# Patient Record
Sex: MALE | Race: WHITE | Employment: FULL TIME | URBAN - METROPOLITAN AREA
[De-identification: names, ages, dates, MRNs, and addresses within clinical notes are randomized per-mention and may not be internally consistent; named-entity substitution may affect disease eponyms.]

---

## 2020-09-08 ENCOUNTER — OFFICE VISIT (OUTPATIENT)
Dept: INTERNAL MEDICINE CLINIC | Age: 55
End: 2020-09-08
Payer: COMMERCIAL

## 2020-09-08 VITALS
HEIGHT: 67 IN | HEART RATE: 90 BPM | WEIGHT: 238 LBS | TEMPERATURE: 97 F | DIASTOLIC BLOOD PRESSURE: 88 MMHG | BODY MASS INDEX: 37.35 KG/M2 | SYSTOLIC BLOOD PRESSURE: 134 MMHG

## 2020-09-08 DIAGNOSIS — Z13.1 DIABETES MELLITUS SCREENING: ICD-10-CM

## 2020-09-08 DIAGNOSIS — Z11.59 NEED FOR HEPATITIS C SCREENING TEST: ICD-10-CM

## 2020-09-08 DIAGNOSIS — Z01.818 PRE-OP EXAM: ICD-10-CM

## 2020-09-08 DIAGNOSIS — Z11.4 SCREENING FOR HIV (HUMAN IMMUNODEFICIENCY VIRUS): ICD-10-CM

## 2020-09-08 DIAGNOSIS — Z13.220 SCREENING CHOLESTEROL LEVEL: ICD-10-CM

## 2020-09-08 DIAGNOSIS — Z00.00 ANNUAL PHYSICAL EXAM: ICD-10-CM

## 2020-09-08 LAB
A/G RATIO: 1.9 (ref 1.1–2.2)
ALBUMIN SERPL-MCNC: 4.5 G/DL (ref 3.4–5)
ALP BLD-CCNC: 72 U/L (ref 40–129)
ALT SERPL-CCNC: 22 U/L (ref 10–40)
ANION GAP SERPL CALCULATED.3IONS-SCNC: 10 MMOL/L (ref 3–16)
AST SERPL-CCNC: 22 U/L (ref 15–37)
BASOPHILS ABSOLUTE: 0 K/UL (ref 0–0.2)
BASOPHILS RELATIVE PERCENT: 0.6 %
BILIRUB SERPL-MCNC: 0.4 MG/DL (ref 0–1)
BUN BLDV-MCNC: 14 MG/DL (ref 7–20)
CALCIUM SERPL-MCNC: 9.9 MG/DL (ref 8.3–10.6)
CHLORIDE BLD-SCNC: 103 MMOL/L (ref 99–110)
CHOLESTEROL, TOTAL: 239 MG/DL (ref 0–199)
CO2: 25 MMOL/L (ref 21–32)
CREAT SERPL-MCNC: 0.9 MG/DL (ref 0.9–1.3)
EOSINOPHILS ABSOLUTE: 0.1 K/UL (ref 0–0.6)
EOSINOPHILS RELATIVE PERCENT: 1.4 %
GFR AFRICAN AMERICAN: >60
GFR NON-AFRICAN AMERICAN: >60
GLOBULIN: 2.4 G/DL
GLUCOSE BLD-MCNC: 92 MG/DL (ref 70–99)
HCT VFR BLD CALC: 46.4 % (ref 40.5–52.5)
HDLC SERPL-MCNC: 36 MG/DL (ref 40–60)
HEMOGLOBIN: 15.5 G/DL (ref 13.5–17.5)
HEPATITIS C ANTIBODY INTERPRETATION: NORMAL
LDL CHOLESTEROL CALCULATED: 147 MG/DL
LYMPHOCYTES ABSOLUTE: 3 K/UL (ref 1–5.1)
LYMPHOCYTES RELATIVE PERCENT: 42.2 %
MCH RBC QN AUTO: 31.5 PG (ref 26–34)
MCHC RBC AUTO-ENTMCNC: 33.4 G/DL (ref 31–36)
MCV RBC AUTO: 94.2 FL (ref 80–100)
MONOCYTES ABSOLUTE: 0.6 K/UL (ref 0–1.3)
MONOCYTES RELATIVE PERCENT: 8.5 %
NEUTROPHILS ABSOLUTE: 3.4 K/UL (ref 1.7–7.7)
NEUTROPHILS RELATIVE PERCENT: 47.3 %
PDW BLD-RTO: 14.1 % (ref 12.4–15.4)
PLATELET # BLD: 247 K/UL (ref 135–450)
PMV BLD AUTO: 8.6 FL (ref 5–10.5)
POTASSIUM SERPL-SCNC: 5 MMOL/L (ref 3.5–5.1)
RBC # BLD: 4.92 M/UL (ref 4.2–5.9)
SODIUM BLD-SCNC: 138 MMOL/L (ref 136–145)
TOTAL PROTEIN: 6.9 G/DL (ref 6.4–8.2)
TRIGL SERPL-MCNC: 278 MG/DL (ref 0–150)
VLDLC SERPL CALC-MCNC: 56 MG/DL
WBC # BLD: 7.1 K/UL (ref 4–11)

## 2020-09-08 PROCEDURE — 93000 ELECTROCARDIOGRAM COMPLETE: CPT | Performed by: NURSE PRACTITIONER

## 2020-09-08 PROCEDURE — 99386 PREV VISIT NEW AGE 40-64: CPT | Performed by: NURSE PRACTITIONER

## 2020-09-08 SDOH — HEALTH STABILITY: MENTAL HEALTH: HOW OFTEN DO YOU HAVE A DRINK CONTAINING ALCOHOL?: 2-3 TIMES A WEEK

## 2020-09-08 ASSESSMENT — ENCOUNTER SYMPTOMS
GASTROINTESTINAL NEGATIVE: 1
RESPIRATORY NEGATIVE: 1

## 2020-09-08 ASSESSMENT — PATIENT HEALTH QUESTIONNAIRE - PHQ9
SUM OF ALL RESPONSES TO PHQ9 QUESTIONS 1 & 2: 0
SUM OF ALL RESPONSES TO PHQ QUESTIONS 1-9: 0
2. FEELING DOWN, DEPRESSED OR HOPELESS: 0
1. LITTLE INTEREST OR PLEASURE IN DOING THINGS: 0
SUM OF ALL RESPONSES TO PHQ QUESTIONS 1-9: 0

## 2020-09-08 NOTE — PROGRESS NOTES
Palpations: Abdomen is soft. Tenderness: There is no abdominal tenderness. There is no guarding or rebound. Musculoskeletal: Normal range of motion. Skin:     General: Skin is warm and dry. Neurological:      Mental Status: He is alert and oriented to person, place, and time. Psychiatric:         Behavior: Behavior normal.         Thought Content: Thought content normal.        /88   Pulse 90   Temp 97 °F (36.1 °C) (Temporal)   Ht 5' 7\" (1.702 m)   Wt 238 lb (108 kg)   BMI 37.28 kg/m²        PHQ Scores 9/8/2020   PHQ2 Score 0   PHQ9 Score 0     Interpretation of Total Score DepressionSeverity: 1-4 = Minimal depression, 5-9 = Mild depression, 10-14 = Moderate depression, 15-19 = Moderately severe depression, 20-27 = Severe depression         ASSESSMENT/PLAN:  Luana Jones was seen today for established new doctor and pre-op exam.  Diagnoses and all orders for this visit:    Encounter to establish care  -H&P reviewed and scanned into EMR  -He is here to establish as well as needs a preop for shoulder surgery. Annual physical exam  -     COMPREHENSIVE METABOLIC PANEL; Future  -     CBC WITH AUTO DIFFERENTIAL; Future  -     LIPID PANEL; Future  -     Hepatitis C Antibody; Future  -     HIV Screen; Future  -     Hemoglobin A1C; Future  -     COLONOSCOPY (Screening)    Pre-op exam  -     EKG 12 lead  -     COMPREHENSIVE METABOLIC PANEL; Future  -     CBC WITH AUTO DIFFERENTIAL; Future    Diabetes mellitus screening  -     Hemoglobin A1C; Future    Screening cholesterol level  -     LIPID PANEL; Future    Screening for HIV (human immunodeficiency virus)  -     HIV Screen; Future    Need for hepatitis C screening test  -     Hepatitis C Antibody; Future    Colon cancer screening  -     COLONOSCOPY (Screening)      47 y.o. patient with planned surgery as above. Known risk factors for perioperative complications: None    1.  Preoperative workup as follows: ECG, hemoglobin, hematocrit, electrolytes, creatinine, glucose, liver function studies  2. Change in medication regimen before surgery: Discontinue NSAIDs 7 days before surgery  3. Prophylaxis for cardiac events with perioperative beta-blockers: Not indicated  4. Deep vein thrombosis prophylaxis: regimen to be chosen by surgical team  5. No contraindications to planned surgery.       Kerri Gruber, APRN - CNP

## 2020-09-09 LAB
ESTIMATED AVERAGE GLUCOSE: 125.5 MG/DL
HBA1C MFR BLD: 6 %
HIV AG/AB: NORMAL
HIV ANTIGEN: NORMAL
HIV-1 ANTIBODY: NORMAL
HIV-2 AB: NORMAL

## 2020-09-09 RX ORDER — ATORVASTATIN CALCIUM 40 MG/1
40 TABLET, FILM COATED ORAL NIGHTLY
Qty: 90 TABLET | Refills: 1 | Status: SHIPPED | OUTPATIENT
Start: 2020-09-09 | End: 2021-01-19 | Stop reason: SDUPTHER

## 2020-09-11 ENCOUNTER — TELEPHONE (OUTPATIENT)
Dept: INTERNAL MEDICINE CLINIC | Age: 55
End: 2020-09-11

## 2020-09-11 NOTE — TELEPHONE ENCOUNTER
----- Message from Leonor Wiggins sent at 9/11/2020  8:09 AM EDT -----  Subject: Message to Provider    QUESTIONS  Information for Provider? Mc adamson/ Pipo Perez called needs the signed   EKG and his labs. Please fax [phone]    [phone] option 5. Surgery is scheduled 09/16/2020  ---------------------------------------------------------------------------  --------------  CALL BACK INFO  What is the best way for the office to contact you? OK to leave message on   voicemail  Preferred Call Back Phone Number? [phone]  ---------------------------------------------------------------------------  --------------  SCRIPT ANSWERS  Relationship to Patient? Other  Representative Name? Devonte Perez  Is the Representative on the appropriate HIPAA document in Epic?  Yes

## 2020-10-27 ENCOUNTER — HOSPITAL ENCOUNTER (INPATIENT)
Age: 55
LOS: 2 days | Discharge: HOME | DRG: 854 | End: 2020-10-29
Attending: EMERGENCY MEDICINE | Admitting: INTERNAL MEDICINE
Payer: COMMERCIAL

## 2020-10-27 ENCOUNTER — APPOINTMENT (OUTPATIENT)
Dept: CT IMAGING | Age: 55
DRG: 854 | End: 2020-10-27
Payer: COMMERCIAL

## 2020-10-27 PROBLEM — N20.0 KIDNEY STONE: Status: ACTIVE | Noted: 2020-10-27

## 2020-10-27 LAB
A/G RATIO: 1.1 (ref 1.1–2.2)
ALBUMIN SERPL-MCNC: 4 G/DL (ref 3.4–5)
ALP BLD-CCNC: 88 U/L (ref 40–129)
ALT SERPL-CCNC: 17 U/L (ref 10–40)
ANION GAP SERPL CALCULATED.3IONS-SCNC: 14 MMOL/L (ref 3–16)
AST SERPL-CCNC: 15 U/L (ref 15–37)
BACTERIA: ABNORMAL /HPF
BASOPHILS ABSOLUTE: 0.1 K/UL (ref 0–0.2)
BASOPHILS RELATIVE PERCENT: 0.3 %
BILIRUB SERPL-MCNC: 0.3 MG/DL (ref 0–1)
BILIRUBIN URINE: NEGATIVE
BLOOD, URINE: ABNORMAL
BUN BLDV-MCNC: 16 MG/DL (ref 7–20)
CALCIUM SERPL-MCNC: 9.6 MG/DL (ref 8.3–10.6)
CHLORIDE BLD-SCNC: 105 MMOL/L (ref 99–110)
CLARITY: ABNORMAL
CO2: 19 MMOL/L (ref 21–32)
COLOR: YELLOW
COMMENT UA: ABNORMAL
CREAT SERPL-MCNC: 1.2 MG/DL (ref 0.9–1.3)
CRYSTALS, UA: ABNORMAL /HPF
EOSINOPHILS ABSOLUTE: 0 K/UL (ref 0–0.6)
EOSINOPHILS RELATIVE PERCENT: 0.2 %
EPITHELIAL CELLS, UA: 0 /HPF (ref 0–5)
GFR AFRICAN AMERICAN: >60
GFR NON-AFRICAN AMERICAN: >60
GLOBULIN: 3.6 G/DL
GLUCOSE BLD-MCNC: 150 MG/DL (ref 70–99)
GLUCOSE URINE: NEGATIVE MG/DL
HCT VFR BLD CALC: 44.5 % (ref 40.5–52.5)
HEMOGLOBIN: 15.2 G/DL (ref 13.5–17.5)
HYALINE CASTS: 4 /LPF (ref 0–8)
KETONES, URINE: NEGATIVE MG/DL
LEUKOCYTE ESTERASE, URINE: ABNORMAL
LIPASE: 22 U/L (ref 13–60)
LYMPHOCYTES ABSOLUTE: 2.6 K/UL (ref 1–5.1)
LYMPHOCYTES RELATIVE PERCENT: 13.7 %
MCH RBC QN AUTO: 31 PG (ref 26–34)
MCHC RBC AUTO-ENTMCNC: 34 G/DL (ref 31–36)
MCV RBC AUTO: 91.2 FL (ref 80–100)
MICROSCOPIC EXAMINATION: YES
MONOCYTES ABSOLUTE: 1.2 K/UL (ref 0–1.3)
MONOCYTES RELATIVE PERCENT: 6.6 %
NEUTROPHILS ABSOLUTE: 14.8 K/UL (ref 1.7–7.7)
NEUTROPHILS RELATIVE PERCENT: 79.2 %
NITRITE, URINE: NEGATIVE
PDW BLD-RTO: 13.6 % (ref 12.4–15.4)
PH UA: 5.5 (ref 5–8)
PLATELET # BLD: 553 K/UL (ref 135–450)
PMV BLD AUTO: 7.2 FL (ref 5–10.5)
POTASSIUM SERPL-SCNC: 4.1 MMOL/L (ref 3.5–5.1)
PROTEIN UA: ABNORMAL MG/DL
RBC # BLD: 4.89 M/UL (ref 4.2–5.9)
RBC UA: 2 /HPF (ref 0–4)
SODIUM BLD-SCNC: 138 MMOL/L (ref 136–145)
SPECIFIC GRAVITY UA: 1.02 (ref 1–1.03)
TOTAL PROTEIN: 7.6 G/DL (ref 6.4–8.2)
URINE REFLEX TO CULTURE: YES
URINE TYPE: ABNORMAL
UROBILINOGEN, URINE: 1 E.U./DL
WBC # BLD: 18.7 K/UL (ref 4–11)
WBC UA: 67 /HPF (ref 0–5)

## 2020-10-27 PROCEDURE — 87086 URINE CULTURE/COLONY COUNT: CPT

## 2020-10-27 PROCEDURE — 6360000002 HC RX W HCPCS: Performed by: INTERNAL MEDICINE

## 2020-10-27 PROCEDURE — 99285 EMERGENCY DEPT VISIT HI MDM: CPT

## 2020-10-27 PROCEDURE — 6370000000 HC RX 637 (ALT 250 FOR IP): Performed by: EMERGENCY MEDICINE

## 2020-10-27 PROCEDURE — 83690 ASSAY OF LIPASE: CPT

## 2020-10-27 PROCEDURE — 96375 TX/PRO/DX INJ NEW DRUG ADDON: CPT

## 2020-10-27 PROCEDURE — 87077 CULTURE AEROBIC IDENTIFY: CPT

## 2020-10-27 PROCEDURE — 81001 URINALYSIS AUTO W/SCOPE: CPT

## 2020-10-27 PROCEDURE — 85025 COMPLETE CBC W/AUTO DIFF WBC: CPT

## 2020-10-27 PROCEDURE — 2580000003 HC RX 258: Performed by: INTERNAL MEDICINE

## 2020-10-27 PROCEDURE — 87186 SC STD MICRODIL/AGAR DIL: CPT

## 2020-10-27 PROCEDURE — 6360000002 HC RX W HCPCS: Performed by: NURSE PRACTITIONER

## 2020-10-27 PROCEDURE — 87088 URINE BACTERIA CULTURE: CPT

## 2020-10-27 PROCEDURE — 80053 COMPREHEN METABOLIC PANEL: CPT

## 2020-10-27 PROCEDURE — 1200000000 HC SEMI PRIVATE

## 2020-10-27 PROCEDURE — 74176 CT ABD & PELVIS W/O CONTRAST: CPT

## 2020-10-27 PROCEDURE — 2580000003 HC RX 258: Performed by: NURSE PRACTITIONER

## 2020-10-27 PROCEDURE — 96374 THER/PROPH/DIAG INJ IV PUSH: CPT

## 2020-10-27 RX ORDER — TAMSULOSIN HYDROCHLORIDE 0.4 MG/1
0.4 CAPSULE ORAL ONCE
Status: COMPLETED | OUTPATIENT
Start: 2020-10-27 | End: 2020-10-27

## 2020-10-27 RX ORDER — ACETAMINOPHEN 650 MG/1
650 SUPPOSITORY RECTAL EVERY 6 HOURS PRN
Status: DISCONTINUED | OUTPATIENT
Start: 2020-10-27 | End: 2020-10-29 | Stop reason: HOSPADM

## 2020-10-27 RX ORDER — 0.9 % SODIUM CHLORIDE 0.9 %
1000 INTRAVENOUS SOLUTION INTRAVENOUS ONCE
Status: COMPLETED | OUTPATIENT
Start: 2020-10-27 | End: 2020-10-27

## 2020-10-27 RX ORDER — PROMETHAZINE HYDROCHLORIDE 25 MG/1
12.5 TABLET ORAL EVERY 6 HOURS PRN
Status: DISCONTINUED | OUTPATIENT
Start: 2020-10-27 | End: 2020-10-29 | Stop reason: HOSPADM

## 2020-10-27 RX ORDER — MORPHINE SULFATE 2 MG/ML
2 INJECTION, SOLUTION INTRAMUSCULAR; INTRAVENOUS EVERY 4 HOURS PRN
Status: DISCONTINUED | OUTPATIENT
Start: 2020-10-27 | End: 2020-10-29 | Stop reason: HOSPADM

## 2020-10-27 RX ORDER — SODIUM CHLORIDE 9 MG/ML
INJECTION, SOLUTION INTRAVENOUS CONTINUOUS
Status: ACTIVE | OUTPATIENT
Start: 2020-10-27 | End: 2020-10-28

## 2020-10-27 RX ORDER — KETOROLAC TROMETHAMINE 30 MG/ML
30 INJECTION, SOLUTION INTRAMUSCULAR; INTRAVENOUS ONCE
Status: COMPLETED | OUTPATIENT
Start: 2020-10-27 | End: 2020-10-27

## 2020-10-27 RX ORDER — SODIUM CHLORIDE 0.9 % (FLUSH) 0.9 %
10 SYRINGE (ML) INJECTION PRN
Status: DISCONTINUED | OUTPATIENT
Start: 2020-10-27 | End: 2020-10-29 | Stop reason: HOSPADM

## 2020-10-27 RX ORDER — SODIUM CHLORIDE 0.9 % (FLUSH) 0.9 %
10 SYRINGE (ML) INJECTION EVERY 12 HOURS SCHEDULED
Status: DISCONTINUED | OUTPATIENT
Start: 2020-10-27 | End: 2020-10-29 | Stop reason: HOSPADM

## 2020-10-27 RX ORDER — TAMSULOSIN HYDROCHLORIDE 0.4 MG/1
0.4 CAPSULE ORAL DAILY
Status: DISCONTINUED | OUTPATIENT
Start: 2020-10-28 | End: 2020-10-29 | Stop reason: HOSPADM

## 2020-10-27 RX ORDER — ONDANSETRON 2 MG/ML
4 INJECTION INTRAMUSCULAR; INTRAVENOUS EVERY 30 MIN PRN
Status: COMPLETED | OUTPATIENT
Start: 2020-10-27 | End: 2020-10-27

## 2020-10-27 RX ORDER — KETOROLAC TROMETHAMINE 30 MG/ML
15 INJECTION, SOLUTION INTRAMUSCULAR; INTRAVENOUS EVERY 6 HOURS PRN
Status: DISCONTINUED | OUTPATIENT
Start: 2020-10-27 | End: 2020-10-29 | Stop reason: HOSPADM

## 2020-10-27 RX ORDER — KETOROLAC TROMETHAMINE 30 MG/ML
INJECTION, SOLUTION INTRAMUSCULAR; INTRAVENOUS
Status: DISCONTINUED
Start: 2020-10-27 | End: 2020-10-27 | Stop reason: WASHOUT

## 2020-10-27 RX ORDER — HYDROCODONE BITARTRATE AND ACETAMINOPHEN 5; 325 MG/1; MG/1
1 TABLET ORAL EVERY 6 HOURS PRN
Status: DISCONTINUED | OUTPATIENT
Start: 2020-10-27 | End: 2020-10-29 | Stop reason: HOSPADM

## 2020-10-27 RX ORDER — POLYETHYLENE GLYCOL 3350 17 G/17G
17 POWDER, FOR SOLUTION ORAL DAILY PRN
Status: DISCONTINUED | OUTPATIENT
Start: 2020-10-27 | End: 2020-10-29 | Stop reason: HOSPADM

## 2020-10-27 RX ORDER — ONDANSETRON 2 MG/ML
4 INJECTION INTRAMUSCULAR; INTRAVENOUS EVERY 6 HOURS PRN
Status: DISCONTINUED | OUTPATIENT
Start: 2020-10-27 | End: 2020-10-29 | Stop reason: HOSPADM

## 2020-10-27 RX ORDER — MORPHINE SULFATE 4 MG/ML
4 INJECTION, SOLUTION INTRAMUSCULAR; INTRAVENOUS ONCE
Status: COMPLETED | OUTPATIENT
Start: 2020-10-27 | End: 2020-10-27

## 2020-10-27 RX ORDER — ATORVASTATIN CALCIUM 40 MG/1
40 TABLET, FILM COATED ORAL NIGHTLY
Status: DISCONTINUED | OUTPATIENT
Start: 2020-10-27 | End: 2020-10-29 | Stop reason: HOSPADM

## 2020-10-27 RX ORDER — ACETAMINOPHEN 325 MG/1
650 TABLET ORAL EVERY 6 HOURS PRN
Status: DISCONTINUED | OUTPATIENT
Start: 2020-10-27 | End: 2020-10-29 | Stop reason: HOSPADM

## 2020-10-27 RX ADMIN — MORPHINE SULFATE 2 MG: 2 INJECTION, SOLUTION INTRAMUSCULAR; INTRAVENOUS at 22:10

## 2020-10-27 RX ADMIN — MORPHINE SULFATE 4 MG: 4 INJECTION INTRAVENOUS at 20:16

## 2020-10-27 RX ADMIN — Medication 10 ML: at 22:13

## 2020-10-27 RX ADMIN — TAMSULOSIN HYDROCHLORIDE 0.4 MG: 0.4 CAPSULE ORAL at 21:01

## 2020-10-27 RX ADMIN — Medication 1 G: at 20:16

## 2020-10-27 RX ADMIN — KETOROLAC TROMETHAMINE 30 MG: 30 INJECTION, SOLUTION INTRAMUSCULAR at 20:16

## 2020-10-27 RX ADMIN — ONDANSETRON 4 MG: 2 INJECTION INTRAMUSCULAR; INTRAVENOUS at 21:01

## 2020-10-27 RX ADMIN — ONDANSETRON 4 MG: 2 INJECTION INTRAMUSCULAR; INTRAVENOUS at 20:16

## 2020-10-27 RX ADMIN — SODIUM CHLORIDE 1000 ML: 9 INJECTION, SOLUTION INTRAVENOUS at 20:16

## 2020-10-27 RX ADMIN — SODIUM CHLORIDE: 9 INJECTION, SOLUTION INTRAVENOUS at 22:29

## 2020-10-27 ASSESSMENT — ENCOUNTER SYMPTOMS
SHORTNESS OF BREATH: 0
DIARRHEA: 0
VOMITING: 1
NAUSEA: 1
ABDOMINAL PAIN: 1
CHEST TIGHTNESS: 0

## 2020-10-27 ASSESSMENT — PAIN DESCRIPTION - LOCATION: LOCATION: FLANK

## 2020-10-27 ASSESSMENT — PAIN DESCRIPTION - ORIENTATION: ORIENTATION: RIGHT

## 2020-10-27 ASSESSMENT — PAIN DESCRIPTION - PAIN TYPE: TYPE: ACUTE PAIN

## 2020-10-27 ASSESSMENT — PAIN SCALES - GENERAL
PAINLEVEL_OUTOF10: 10
PAINLEVEL_OUTOF10: 8
PAINLEVEL_OUTOF10: 10

## 2020-10-27 NOTE — ED PROVIDER NOTES
I independently performed a [facility] and [facility] All diagnostic, treatment, and disposition decisions were made by myself in conjunction with the advanced practice provider. I have participated in the medical decision making and directed the treatment plan and disposition of the patient. For further details of [facility] Hankins emergency department encounter, please see the advanced practice provider's documentation. CHIEF COMPLAINT  Chief Complaint   Patient presents with    Flank Pain     right sided flank pain that goes into the right side of abdomen, difficulty urinating, started couple hours ago, took a laxative, no relief     Briefly, Murtaza Becerril is a 47 y.o. male  who presents to the ED complaining of right flank pain, acute onset around 2 PM, never had anything like it before. He has had some difficulty with urination since then. He is markedly uncomfortable on initial assessment. He has had no fevers but has some nausea vomiting. No testicular pain. No symptoms on the left side of his flank or abdomen. FOCUSED PHYSICAL EXAMINATION  BP (!) 166/92   Pulse 88   Temp 98.9 °F (37.2 °C) (Oral)   Resp 18   Ht 5' 6\" (1.676 m)   Wt 230 lb (104.3 kg)   SpO2 97%   BMI 37.12 kg/m²    Focused physical examination notable for moderately uncomfortable appearing with mild acute distress, well-appearing, well-nourished, normal speech and mentation without obvious facial droop, no obvious rash. No obvious cranial nerve deficits on my initial exam.  Regular rate and rhythm, clear to auscultation bilaterally. Moderate right flank, right lower quadrant and right CVA tenderness as well as mild right upper quadrant tenderness. No tenderness anywhere on the left side. No peritonitis or abdominal distention.       MDM:  Diagnostic considerations included kidney stone, pyelonephritis, UTI, appendicitis, bowel obstruction, diverticulitis, hernia, gastritis/gastroenteritis, pancreatitis, cholecystitis, hepatitis, constipation, IBS, IBD    ED course was notable for right-sided renal colic from a 3 mm kidney stone, with leukocytosis and infected appearing urinalysis. As such I am concerned for an infected kidney stone and will administer IV antibiotics, medications for symptom control and admission to the hospital.  Patient is not hypotensive or febrile though. Flomax, urine strainer ordered. Dr. Carol Pinedo from urology was consulted about the patient's ED history, physical, workup, and course so far. Recommendations from this consultant included admit, NPO after midnight, strain urine. SEP-1 CORE MEASURE DATA    Classification: exclude from core measure    Exclusion criteria: the patient is NOT to be included for sepsis due to:  SIRS not met (only WBC elevated)    During the patient's ED course, the patient was given:  Medications   ondansetron (ZOFRAN) injection 4 mg (4 mg Intravenous Given 10/27/20 2016)   0.9 % sodium chloride bolus (1,000 mLs Intravenous New Bag 10/27/20 2016)   tamsulosin (FLOMAX) capsule 0.4 mg (has no administration in time range)   ketorolac (TORADOL) injection 30 mg (30 mg Intravenous Given 10/27/20 2016)   morphine injection 4 mg (4 mg Intravenous Given 10/27/20 2016)   cefTRIAXone (ROCEPHIN) 1 g in sterile water 10 mL IV syringe (1 g Intravenous Given 10/27/20 2016)        CLINICAL IMPRESSION  1. Right kidney stone    2. Acute cystitis without hematuria    3. Renal colic    4. Leukocytosis, unspecified type        [address] was admitted in fair condition. The plan is to admit to the hospital at this time under the hospitalist service. Hospitalist accepted the patient and will take over the patient's care. The total critical care time spent while evaluating and treating this patient was 35 minutes. This excludes time spent doing separately billable procedures.   This includes time at the bedside, data interpretation, medication

## 2020-10-27 NOTE — ED PROVIDER NOTES
[address]        Pt Name: Montserrat Dumas  MRN: [id number]  [place] 1965  Date of evaluation: 10/27/2020  Provider: SUGAR Davis - CNP  PCP: Chestine Apley, MD     I have seen and evaluated this patient with my supervising physician Attila cowan       Chief Complaint   Patient presents with    Flank Pain     right sided flank pain that goes into the right side of abdomen, difficulty urinating, started couple hours ago, took a laxative, no relief       HISTORY OF PRESENT ILLNESS   (Location, Timing/Onset, Context/Setting, Quality, Duration, Modifying Factors, Severity, Associated Signs and Symptoms)  Note limiting factors. Montserrat Dumas is a 47 y.o. male presents to the emergency department complaining of acute onset of right-sided flank pain at 2:00 PM.  Reports that the pain has waxed and waned but increased in severity. States he has had multiple episodes of nonbilious nonbloody emesis. He denies history of kidney stone or previous abdominal pain. Reports difficulty voiding with urgent sensation. Describes pain as dull/aching. Denies any headache, fever, lightheadedness, dizziness, visual disturbances. No chest pain or pressure. No neck or back pain. No shortness of breath, cough, or congestion. No diarrhea, constipation, or dysuria. No rash. Nursing Notes were all reviewed and agreed with or any disagreements were addressed in the HPI. REVIEW OF SYSTEMS    (2-9 systems for level 4, 10 or more for level 5)     Review of Systems   Constitutional: Negative for activity change, chills and fever. Respiratory: Negative for chest tightness and shortness of breath. Cardiovascular: Negative for chest pain. Gastrointestinal: Positive for abdominal pain, nausea and vomiting. Negative for diarrhea. Genitourinary: Positive for flank pain. Negative for dysuria.    All other systems reviewed and are negative. Positives and Pertinent negatives as per HPI. Except as noted above in the ROS, all other systems were reviewed and negative. PAST MEDICAL HISTORY   History reviewed. No pertinent past medical history. SURGICAL HISTORY     Past Surgical History:   Procedure Laterality Date    COLONOSCOPY      with polyp removal    KNEE ARTHROSCOPY Right     LEG SURGERY Left 07/29/2016    LEFT LOWER LEG FOREIGN BODY REMOVAL WITH FLUOROSCOPIC GUIDANCE         CURRENTMEDICATIONS       Current Discharge Medication List      CONTINUE these medications which have NOT CHANGED    Details   atorvastatin (LIPITOR) 40 MG tablet Take 1 tablet by mouth nightly  Qty: 90 tablet, Refills: 1    Associated Diagnoses: Mixed hyperlipidemia               ALLERGIES     Codeine; Percocet [oxycodone-acetaminophen]; and Vicodin [hydrocodone-acetaminophen]    FAMILYHISTORY       Family History   Problem Relation Age of Onset    High Blood Pressure Mother     Other Father         parkinsons          SOCIAL HISTORY       Social History     Tobacco Use    Smoking status: Never Smoker    Smokeless tobacco: Never Used   Substance Use Topics    Alcohol use: Yes     Frequency: 2-3 times a week     Comment: social    Drug use: Not Currently     Types: Marijuana     Comment: occassional       SCREENINGS             PHYSICAL EXAM    (up to 7 for level 4, 8 or more for level 5)     ED Triage Vitals [10/27/20 1647]   BP Temp Temp Source Pulse Resp SpO2 Height Weight   (!) 166/92 98.9 °F (37.2 °C) Oral 88 18 97 % 5' 6\" (1.676 m) 230 lb (104.3 kg)       Physical Exam  Vitals signs and nursing note reviewed. Constitutional:       Appearance: He is well-developed. He is not diaphoretic. HENT:      Head: Normocephalic and atraumatic. Right Ear: External ear normal.      Left Ear: External ear normal.   Eyes:      General:         Right eye: No discharge. Left eye: No discharge.    Neck:      Musculoskeletal: Normal range of motion and neck supple. Vascular: No JVD. Cardiovascular:      Rate and Rhythm: Normal rate and regular rhythm. Pulses: Normal pulses. Heart sounds: Normal heart sounds. Pulmonary:      Effort: Pulmonary effort is normal. No respiratory distress. Breath sounds: Normal breath sounds. Abdominal:      Palpations: Abdomen is soft. Tenderness: There is abdominal tenderness. Musculoskeletal: Normal range of motion. Skin:     General: Skin is warm and dry. Coloration: Skin is not pale. Neurological:      Mental Status: He is alert and oriented to person, place, and time. Psychiatric:         Behavior: Behavior normal.         DIAGNOSTIC RESULTS   LABS:    Labs Reviewed   CBC WITH AUTO DIFFERENTIAL - Abnormal; Notable for the following components:       Result Value    WBC 18.7 (*)     Platelets 437 (*)     Neutrophils Absolute 14.8 (*)     All other components within normal limits    Narrative:     Performed at:  [facility] [address], [place], [address]   Phone [phone]   COMPREHENSIVE METABOLIC PANEL - Abnormal; Notable for the following components:    CO2 19 (*)     Glucose 150 (*)     All other components within normal limits    Narrative:     Performed at:  [facility] [address], [place], [facility] [address]   Phone [phone]   URINE RT REFLEX TO CULTURE - Abnormal; Notable for the following components:    Clarity, UA CLOUDY (*)     Blood, Urine SMALL (*)     Protein, UA TRACE (*)     Leukocyte Esterase, Urine MODERATE (*)     All other components within normal limits    Narrative:     Performed at:  [facility] [address], [place], [address]   Phone [phone]   MICROSCOPIC URINALYSIS - Abnormal; Notable for the following components:    Bacteria, UA RARE (*)     Crystals, UA Few Ca.  Oxalate (*)     WBC, UA 67 (*)     All other components within normal limits    Narrative:     Performed at:  [facility]  [address] [facility]  [place], [address]   Phone [phone]   CULTURE, URINE   LIPASE    Narrative:     Performed at:  [facility]  [address] [facility],  [place], [address]   Phone [phone]   COMPREHENSIVE METABOLIC PANEL   MAGNESIUM   PHOSPHORUS   CBC WITH AUTO DIFFERENTIAL       All other labs were within normal range or not returned as of this dictation. EKG: All EKG's are interpreted by the Emergency Department Physician in the absence of a cardiologist.  Please see their note for interpretation of EKG. RADIOLOGY:   Non-plain film images such as CT, Ultrasound and MRI are read by the radiologist. Plain radiographic images are visualized and preliminarily interpreted by the ED Provider with the below findings:        Interpretation per the Radiologist below, if available at the time of this note:    CT ABDOMEN PELVIS WO CONTRAST Additional Contrast? None   Preliminary Result   1. 3 mm distal right ureteral calculus with mild hydronephrosis. Nonobstructive left nephrolithiasis. 2. No other acute findings within the abdomen or pelvis. Colonic   diverticulosis with no acute features. The appendix is unremarkable. 3. Mild prostatomegaly. Ct Abdomen Pelvis Wo Contrast Additional Contrast? None    Result Date: 10/27/2020  EXAMINATION: CT OF THE ABDOMEN AND PELVIS WITHOUT CONTRAST 10/27/2020 4:59 pm TECHNIQUE: CT of the abdomen and pelvis was performed without the administration of intravenous contrast. Multiplanar reformatted images are provided for review. Dose modulation, iterative reconstruction, and/or weight based adjustment of the mA/kV was utilized to reduce the radiation dose to as low as reasonably achievable. COMPARISON: None.  HISTORY: ORDERING SYSTEM PROVIDED HISTORY: R flank pain renal colic suspected TECHNOLOGIST PROVIDED HISTORY: Reason for exam:->R flank pain renal colic suspected Additional Contrast?->None Reason for Exam: R flank pain renal colic suspected. Flank Pain (right sided flank pain that goes into the right side of abdomen, difficulty urinating, started couple hours ago, took a laxative, no relief). Acuity: Acute Type of Exam: Initial FINDINGS: Lower Chest: No acute infiltrate at the lung bases. Organs: The unenhanced liver, spleen, pancreas and adrenal glands are unremarkable. No acute biliary findings. 3 mm distal right ureteral calculus with mild hydronephrosis and mild infiltration of the perinephric and periureteric fat. Punctate nonobstructive middle pole left renal calculus. GI/Bowel: Scattered colonic diverticulosis with no acute features. Submucosal fat throughout much of the colon suggesting a remote or chronic inflammatory process. The appendix is unremarkable. No small bowel distension. The stomach and duodenal sweep are unremarkable. Pelvis: No pelvic mass or free pelvic fluid. Mild prostatomegaly. The bladder is contracted. Peritoneum/Retroperitoneum: The abdominal aorta is normal in caliber with minimal atherosclerotic plaque. No retroperitoneal adenopathy or upper abdominal ascites. Bones/Soft Tissues: No acute osseous or soft tissue abnormality. Bilateral fat containing inguinal hernias. 1. 3 mm distal right ureteral calculus with mild hydronephrosis. Nonobstructive left nephrolithiasis. 2. No other acute findings within the abdomen or pelvis. Colonic diverticulosis with no acute features. The appendix is unremarkable. 3. Mild prostatomegaly. PROCEDURES   Unless otherwise noted below, none     Procedures    CRITICAL CARE TIME   The total critical care time spent while evaluating and treating this patient was at least 41 minutes. This excludes time spent doing separately billable procedures.   This includes time at the bedside, data interpretation, medication management, obtaining critical history from collateral sources if the patient is unable to provide it directly, and physician consultation. Specifics of interventions taken and potentially life-threatening diagnostic considerations are listed above in the medical decision making.       CONSULTS:  IP CONSULT TO UROLOGY  IP CONSULT TO HOSPITALIST  IP CONSULT TO UROLOGY      EMERGENCY DEPARTMENT COURSE and DIFFERENTIAL DIAGNOSIS/MDM:   Vitals:    Vitals:    10/27/20 2115 10/27/20 2130 10/27/20 2210 10/28/20 0000   BP:  (!) 153/84 139/73    Pulse:   105    Resp:   16    Temp:   101.1 °F (38.4 °C) 102.5 °F (39.2 °C)   TempSrc:   Oral    SpO2: 95% 95% 95%    Weight:       Height:           Patient was given the following medications:  Medications   atorvastatin (LIPITOR) tablet 40 mg (40 mg Oral Not Given 10/27/20 2214)   tamsulosin (FLOMAX) capsule 0.4 mg (has no administration in time range)   cefTRIAXone (ROCEPHIN) 1 g in sterile water 10 mL IV syringe (has no administration in time range)   0.9 % sodium chloride infusion ( Intravenous New Bag 10/27/20 2229)   HYDROcodone-acetaminophen (NORCO) 5-325 MG per tablet 1 tablet (has no administration in time range)   morphine (PF) injection 2 mg (2 mg Intravenous Given 10/27/20 2210)   sodium chloride flush 0.9 % injection 10 mL (10 mLs Intravenous Given 10/27/20 2213)   sodium chloride flush 0.9 % injection 10 mL (has no administration in time range)   acetaminophen (TYLENOL) tablet 650 mg (650 mg Oral Given 10/28/20 0009)     Or   acetaminophen (TYLENOL) suppository 650 mg ( Rectal See Alternative 10/28/20 0009)   polyethylene glycol (GLYCOLAX) packet 17 g (has no administration in time range)   promethazine (PHENERGAN) tablet 12.5 mg (has no administration in time range)     Or   ondansetron (ZOFRAN) injection 4 mg (has no administration in time range)   enoxaparin (LOVENOX) injection 40 mg (has no administration in time range)   ketorolac (TORADOL) injection 15 mg (15 mg Intravenous Given 10/28/20 0009) ketorolac (TORADOL) injection 30 mg (30 mg Intravenous Given 10/27/20 2016)   ondansetron (ZOFRAN) injection 4 mg (4 mg Intravenous Given 10/27/20 2101)   0.9 % sodium chloride bolus (1,000 mLs Intravenous New Bag 10/27/20 2016)   morphine injection 4 mg (4 mg Intravenous Given 10/27/20 2016)   cefTRIAXone (ROCEPHIN) 1 g in sterile water 10 mL IV syringe (1 g Intravenous Given 10/27/20 2016)   tamsulosin (FLOMAX) capsule 0.4 mg (0.4 mg Oral Given 10/27/20 2101)           Briefly, this is a 47year old male that presents to the emergency department complaining of acute onset of right-sided flank pain at 2:00 PM.  Reports that the pain has waxed and waned but increased in severity. States he has had multiple episodes of nonbilious nonbloody emesis. He denies history of kidney stone or previous abdominal pain. Reports difficulty voiding with urgent sensation. Describes pain as dull/aching. Patient was given Zofran, morphine, fluids and Toradol. CBC shows leukocytosis white count 8.7. CMP is unremarkable. UA is concerning for infection- rocephin given. Patient still complaining of severe pain despite morphine, toradol, zofran, and fluids. We did talk with urology, Dr. Campos Asp, he does recommend admission to hospitalist services, n.p.o. at midnight and plan for OR in the morning. hospitalist does accept this patient in admission. Patient is agreeable regarding plan of care. FINAL IMPRESSION      1. Right kidney stone    2. Acute cystitis without hematuria    3. Renal colic    4. Leukocytosis, unspecified type          DISPOSITION/PLAN   DISPOSITION Admitted 10/27/2020 08:34:19 PM      PATIENT REFERREDTO:  No follow-up provider specified.     DISCHARGE MEDICATIONS:  Current Discharge Medication List          DISCONTINUED MEDICATIONS:  Current Discharge Medication List                 (Please note that portions of this note were completed with a voice recognition program.  Efforts were made to edit

## 2020-10-28 ENCOUNTER — ANESTHESIA (OUTPATIENT)
Dept: OPERATING ROOM | Age: 55
DRG: 854 | End: 2020-10-28
Payer: COMMERCIAL

## 2020-10-28 ENCOUNTER — ANESTHESIA EVENT (OUTPATIENT)
Dept: OPERATING ROOM | Age: 55
DRG: 854 | End: 2020-10-28
Payer: COMMERCIAL

## 2020-10-28 ENCOUNTER — APPOINTMENT (OUTPATIENT)
Dept: GENERAL RADIOLOGY | Age: 55
DRG: 854 | End: 2020-10-28
Payer: COMMERCIAL

## 2020-10-28 VITALS
OXYGEN SATURATION: 100 % | DIASTOLIC BLOOD PRESSURE: 69 MMHG | SYSTOLIC BLOOD PRESSURE: 124 MMHG | RESPIRATION RATE: 12 BRPM

## 2020-10-28 PROBLEM — N12 PYELONEPHRITIS: Status: ACTIVE | Noted: 2020-10-28

## 2020-10-28 PROBLEM — A41.9 SEPSIS (HCC): Status: ACTIVE | Noted: 2020-10-28

## 2020-10-28 LAB
A/G RATIO: 1.3 (ref 1.1–2.2)
ALBUMIN SERPL-MCNC: 3.3 G/DL (ref 3.4–5)
ALP BLD-CCNC: 65 U/L (ref 40–129)
ALT SERPL-CCNC: 13 U/L (ref 10–40)
ANION GAP SERPL CALCULATED.3IONS-SCNC: 10 MMOL/L (ref 3–16)
AST SERPL-CCNC: 14 U/L (ref 15–37)
BASOPHILS ABSOLUTE: 0 K/UL (ref 0–0.2)
BASOPHILS RELATIVE PERCENT: 0.2 %
BILIRUB SERPL-MCNC: 0.5 MG/DL (ref 0–1)
BUN BLDV-MCNC: 18 MG/DL (ref 7–20)
CALCIUM SERPL-MCNC: 8.3 MG/DL (ref 8.3–10.6)
CHLORIDE BLD-SCNC: 106 MMOL/L (ref 99–110)
CO2: 21 MMOL/L (ref 21–32)
CREAT SERPL-MCNC: 1.4 MG/DL (ref 0.9–1.3)
EOSINOPHILS ABSOLUTE: 0 K/UL (ref 0–0.6)
EOSINOPHILS RELATIVE PERCENT: 0.1 %
GFR AFRICAN AMERICAN: >60
GFR NON-AFRICAN AMERICAN: 53
GLOBULIN: 2.6 G/DL
GLUCOSE BLD-MCNC: 112 MG/DL (ref 70–99)
HCT VFR BLD CALC: 35.9 % (ref 40.5–52.5)
HEMOGLOBIN: 12 G/DL (ref 13.5–17.5)
LYMPHOCYTES ABSOLUTE: 2.2 K/UL (ref 1–5.1)
LYMPHOCYTES RELATIVE PERCENT: 9.5 %
MAGNESIUM: 2 MG/DL (ref 1.8–2.4)
MCH RBC QN AUTO: 31.1 PG (ref 26–34)
MCHC RBC AUTO-ENTMCNC: 33.3 G/DL (ref 31–36)
MCV RBC AUTO: 93.3 FL (ref 80–100)
MONOCYTES ABSOLUTE: 2 K/UL (ref 0–1.3)
MONOCYTES RELATIVE PERCENT: 8.5 %
NEUTROPHILS ABSOLUTE: 19 K/UL (ref 1.7–7.7)
NEUTROPHILS RELATIVE PERCENT: 81.7 %
PDW BLD-RTO: 14.1 % (ref 12.4–15.4)
PHOSPHORUS: 4.4 MG/DL (ref 2.5–4.9)
PLATELET # BLD: 369 K/UL (ref 135–450)
PMV BLD AUTO: 7.6 FL (ref 5–10.5)
POTASSIUM SERPL-SCNC: 4.4 MMOL/L (ref 3.5–5.1)
RBC # BLD: 3.85 M/UL (ref 4.2–5.9)
SODIUM BLD-SCNC: 137 MMOL/L (ref 136–145)
TOTAL PROTEIN: 5.9 G/DL (ref 6.4–8.2)
WBC # BLD: 23.2 K/UL (ref 4–11)

## 2020-10-28 PROCEDURE — 6370000000 HC RX 637 (ALT 250 FOR IP): Performed by: INTERNAL MEDICINE

## 2020-10-28 PROCEDURE — 6360000002 HC RX W HCPCS: Performed by: ANESTHESIOLOGY

## 2020-10-28 PROCEDURE — 3600000014 HC SURGERY LEVEL 4 ADDTL 15MIN: Performed by: UROLOGY

## 2020-10-28 PROCEDURE — 36415 COLL VENOUS BLD VENIPUNCTURE: CPT

## 2020-10-28 PROCEDURE — 94760 N-INVAS EAR/PLS OXIMETRY 1: CPT

## 2020-10-28 PROCEDURE — 6370000000 HC RX 637 (ALT 250 FOR IP): Performed by: UROLOGY

## 2020-10-28 PROCEDURE — 1200000000 HC SEMI PRIVATE

## 2020-10-28 PROCEDURE — 2709999900 HC NON-CHARGEABLE SUPPLY: Performed by: UROLOGY

## 2020-10-28 PROCEDURE — 83735 ASSAY OF MAGNESIUM: CPT

## 2020-10-28 PROCEDURE — 3700000000 HC ANESTHESIA ATTENDED CARE: Performed by: UROLOGY

## 2020-10-28 PROCEDURE — 2580000003 HC RX 258: Performed by: UROLOGY

## 2020-10-28 PROCEDURE — 3600000004 HC SURGERY LEVEL 4 BASE: Performed by: UROLOGY

## 2020-10-28 PROCEDURE — 3700000001 HC ADD 15 MINUTES (ANESTHESIA): Performed by: UROLOGY

## 2020-10-28 PROCEDURE — 84100 ASSAY OF PHOSPHORUS: CPT

## 2020-10-28 PROCEDURE — 80053 COMPREHEN METABOLIC PANEL: CPT

## 2020-10-28 PROCEDURE — 85025 COMPLETE CBC W/AUTO DIFF WBC: CPT

## 2020-10-28 PROCEDURE — 7100000000 HC PACU RECOVERY - FIRST 15 MIN: Performed by: UROLOGY

## 2020-10-28 PROCEDURE — 0T768DZ DILATION OF RIGHT URETER WITH INTRALUMINAL DEVICE, VIA NATURAL OR ARTIFICIAL OPENING ENDOSCOPIC: ICD-10-PCS | Performed by: UROLOGY

## 2020-10-28 PROCEDURE — C2617 STENT, NON-COR, TEM W/O DEL: HCPCS | Performed by: UROLOGY

## 2020-10-28 PROCEDURE — 2500000003 HC RX 250 WO HCPCS: Performed by: NURSE ANESTHETIST, CERTIFIED REGISTERED

## 2020-10-28 PROCEDURE — 7100000001 HC PACU RECOVERY - ADDTL 15 MIN: Performed by: UROLOGY

## 2020-10-28 PROCEDURE — 6360000002 HC RX W HCPCS: Performed by: INTERNAL MEDICINE

## 2020-10-28 PROCEDURE — 6360000002 HC RX W HCPCS: Performed by: UROLOGY

## 2020-10-28 PROCEDURE — 6360000002 HC RX W HCPCS: Performed by: NURSE ANESTHETIST, CERTIFIED REGISTERED

## 2020-10-28 PROCEDURE — 74420 UROGRAPHY RTRGR +-KUB: CPT

## 2020-10-28 DEVICE — STENT URET 6FR L26CM PERCFLX HYDR+ TAPR TIP GRAD: Type: IMPLANTABLE DEVICE | Site: URETER | Status: FUNCTIONAL

## 2020-10-28 RX ORDER — LIDOCAINE HYDROCHLORIDE 20 MG/ML
JELLY TOPICAL
Status: COMPLETED | OUTPATIENT
Start: 2020-10-28 | End: 2020-10-28

## 2020-10-28 RX ORDER — DEXAMETHASONE SODIUM PHOSPHATE 4 MG/ML
INJECTION, SOLUTION INTRA-ARTICULAR; INTRALESIONAL; INTRAMUSCULAR; INTRAVENOUS; SOFT TISSUE PRN
Status: DISCONTINUED | OUTPATIENT
Start: 2020-10-28 | End: 2020-10-28 | Stop reason: SDUPTHER

## 2020-10-28 RX ORDER — PROPOFOL 10 MG/ML
INJECTION, EMULSION INTRAVENOUS PRN
Status: DISCONTINUED | OUTPATIENT
Start: 2020-10-28 | End: 2020-10-28 | Stop reason: SDUPTHER

## 2020-10-28 RX ORDER — FENTANYL CITRATE 50 UG/ML
50 INJECTION, SOLUTION INTRAMUSCULAR; INTRAVENOUS EVERY 5 MIN PRN
Status: DISCONTINUED | OUTPATIENT
Start: 2020-10-28 | End: 2020-10-29 | Stop reason: HOSPADM

## 2020-10-28 RX ORDER — ONDANSETRON 2 MG/ML
4 INJECTION INTRAMUSCULAR; INTRAVENOUS ONCE
Status: COMPLETED | OUTPATIENT
Start: 2020-10-28 | End: 2020-10-28

## 2020-10-28 RX ORDER — ONDANSETRON 2 MG/ML
INJECTION INTRAMUSCULAR; INTRAVENOUS PRN
Status: DISCONTINUED | OUTPATIENT
Start: 2020-10-28 | End: 2020-10-28 | Stop reason: SDUPTHER

## 2020-10-28 RX ORDER — KETOROLAC TROMETHAMINE 30 MG/ML
INJECTION, SOLUTION INTRAMUSCULAR; INTRAVENOUS PRN
Status: DISCONTINUED | OUTPATIENT
Start: 2020-10-28 | End: 2020-10-28 | Stop reason: SDUPTHER

## 2020-10-28 RX ORDER — ROCURONIUM BROMIDE 10 MG/ML
INJECTION, SOLUTION INTRAVENOUS PRN
Status: DISCONTINUED | OUTPATIENT
Start: 2020-10-28 | End: 2020-10-28 | Stop reason: SDUPTHER

## 2020-10-28 RX ORDER — FENTANYL CITRATE 50 UG/ML
INJECTION, SOLUTION INTRAMUSCULAR; INTRAVENOUS PRN
Status: DISCONTINUED | OUTPATIENT
Start: 2020-10-28 | End: 2020-10-28 | Stop reason: SDUPTHER

## 2020-10-28 RX ORDER — MAGNESIUM HYDROXIDE 1200 MG/15ML
LIQUID ORAL
Status: COMPLETED | OUTPATIENT
Start: 2020-10-28 | End: 2020-10-28

## 2020-10-28 RX ORDER — SUCCINYLCHOLINE/SOD CL,ISO/PF 100 MG/5ML
SYRINGE (ML) INTRAVENOUS PRN
Status: DISCONTINUED | OUTPATIENT
Start: 2020-10-28 | End: 2020-10-28 | Stop reason: SDUPTHER

## 2020-10-28 RX ADMIN — DEXAMETHASONE SODIUM PHOSPHATE 8 MG: 4 INJECTION, SOLUTION INTRAMUSCULAR; INTRAVENOUS at 15:29

## 2020-10-28 RX ADMIN — ONDANSETRON 4 MG: 2 INJECTION INTRAMUSCULAR; INTRAVENOUS at 11:54

## 2020-10-28 RX ADMIN — KETOROLAC TROMETHAMINE 15 MG: 30 INJECTION, SOLUTION INTRAMUSCULAR at 12:54

## 2020-10-28 RX ADMIN — ATORVASTATIN CALCIUM 40 MG: 40 TABLET, FILM COATED ORAL at 19:55

## 2020-10-28 RX ADMIN — FENTANYL CITRATE 50 MCG: 50 INJECTION INTRAMUSCULAR; INTRAVENOUS at 15:30

## 2020-10-28 RX ADMIN — PHENYLEPHRINE HYDROCHLORIDE 100 MCG: 10 INJECTION INTRAVENOUS at 15:39

## 2020-10-28 RX ADMIN — ROCURONIUM BROMIDE 30 MG: 10 INJECTION, SOLUTION INTRAVENOUS at 15:36

## 2020-10-28 RX ADMIN — KETOROLAC TROMETHAMINE 30 MG: 30 INJECTION, SOLUTION INTRAMUSCULAR; INTRAVENOUS at 15:45

## 2020-10-28 RX ADMIN — HYDROCODONE BITARTRATE AND ACETAMINOPHEN 1 TABLET: 5; 325 TABLET ORAL at 19:56

## 2020-10-28 RX ADMIN — MORPHINE SULFATE 2 MG: 2 INJECTION, SOLUTION INTRAMUSCULAR; INTRAVENOUS at 08:12

## 2020-10-28 RX ADMIN — PHENYLEPHRINE HYDROCHLORIDE 100 MCG: 10 INJECTION INTRAVENOUS at 15:42

## 2020-10-28 RX ADMIN — KETOROLAC TROMETHAMINE 15 MG: 30 INJECTION, SOLUTION INTRAMUSCULAR at 00:09

## 2020-10-28 RX ADMIN — FENTANYL CITRATE 50 MCG: 50 INJECTION, SOLUTION INTRAMUSCULAR; INTRAVENOUS at 14:18

## 2020-10-28 RX ADMIN — Medication 1 G: at 19:55

## 2020-10-28 RX ADMIN — KETOROLAC TROMETHAMINE 15 MG: 30 INJECTION, SOLUTION INTRAMUSCULAR at 07:24

## 2020-10-28 RX ADMIN — MORPHINE SULFATE 2 MG: 2 INJECTION, SOLUTION INTRAMUSCULAR; INTRAVENOUS at 11:54

## 2020-10-28 RX ADMIN — CEFAZOLIN SODIUM 2 G: 10 INJECTION, POWDER, FOR SOLUTION INTRAVENOUS at 15:18

## 2020-10-28 RX ADMIN — ACETAMINOPHEN 650 MG: 325 TABLET ORAL at 00:09

## 2020-10-28 RX ADMIN — ONDANSETRON 4 MG: 2 INJECTION INTRAMUSCULAR; INTRAVENOUS at 14:18

## 2020-10-28 RX ADMIN — Medication 10 ML: at 19:59

## 2020-10-28 RX ADMIN — MORPHINE SULFATE 2 MG: 2 INJECTION, SOLUTION INTRAMUSCULAR; INTRAVENOUS at 02:00

## 2020-10-28 RX ADMIN — SUGAMMADEX 200 MG: 100 INJECTION, SOLUTION INTRAVENOUS at 15:48

## 2020-10-28 RX ADMIN — Medication 100 MG: at 15:33

## 2020-10-28 RX ADMIN — PROPOFOL 200 MG: 10 INJECTION, EMULSION INTRAVENOUS at 15:29

## 2020-10-28 RX ADMIN — ONDANSETRON 4 MG: 2 INJECTION INTRAMUSCULAR; INTRAVENOUS at 15:37

## 2020-10-28 RX ADMIN — ONDANSETRON 4 MG: 2 INJECTION INTRAMUSCULAR; INTRAVENOUS at 19:55

## 2020-10-28 ASSESSMENT — PAIN DESCRIPTION - FREQUENCY
FREQUENCY: INTERMITTENT
FREQUENCY: CONTINUOUS

## 2020-10-28 ASSESSMENT — PAIN DESCRIPTION - LOCATION
LOCATION: ABDOMEN;GROIN
LOCATION: ABDOMEN
LOCATION: ABDOMEN;GROIN
LOCATION: ABDOMEN;GROIN

## 2020-10-28 ASSESSMENT — PULMONARY FUNCTION TESTS
PIF_VALUE: 27
PIF_VALUE: 0
PIF_VALUE: 24
PIF_VALUE: 27
PIF_VALUE: 22
PIF_VALUE: 20
PIF_VALUE: 21
PIF_VALUE: 20
PIF_VALUE: 2
PIF_VALUE: 1
PIF_VALUE: 1
PIF_VALUE: 23
PIF_VALUE: 4
PIF_VALUE: 16
PIF_VALUE: 23
PIF_VALUE: 21
PIF_VALUE: 27
PIF_VALUE: 0
PIF_VALUE: 2
PIF_VALUE: 32
PIF_VALUE: 22
PIF_VALUE: 0
PIF_VALUE: 24
PIF_VALUE: 22
PIF_VALUE: 21
PIF_VALUE: 24
PIF_VALUE: 16
PIF_VALUE: 0
PIF_VALUE: 20
PIF_VALUE: 3
PIF_VALUE: 1

## 2020-10-28 ASSESSMENT — PAIN SCALES - GENERAL
PAINLEVEL_OUTOF10: 9
PAINLEVEL_OUTOF10: 7
PAINLEVEL_OUTOF10: 8
PAINLEVEL_OUTOF10: 1
PAINLEVEL_OUTOF10: 10
PAINLEVEL_OUTOF10: 8
PAINLEVEL_OUTOF10: 8
PAINLEVEL_OUTOF10: 10
PAINLEVEL_OUTOF10: 8

## 2020-10-28 ASSESSMENT — PAIN DESCRIPTION - DESCRIPTORS
DESCRIPTORS: SHARP

## 2020-10-28 ASSESSMENT — PAIN DESCRIPTION - ONSET
ONSET: ON-GOING

## 2020-10-28 ASSESSMENT — PAIN DESCRIPTION - ORIENTATION
ORIENTATION: RIGHT

## 2020-10-28 ASSESSMENT — PAIN DESCRIPTION - PAIN TYPE
TYPE: ACUTE PAIN

## 2020-10-28 ASSESSMENT — PAIN DESCRIPTION - PROGRESSION
CLINICAL_PROGRESSION: NOT CHANGED

## 2020-10-28 ASSESSMENT — PAIN - FUNCTIONAL ASSESSMENT: PAIN_FUNCTIONAL_ASSESSMENT: 0-10

## 2020-10-28 NOTE — PROGRESS NOTES
Patient alert, VSS. Denies pain, all phase 1 discharge criteria met, seen by anesthesia. Will transfer back to [facility].

## 2020-10-28 NOTE — PROGRESS NOTES
Patient transferred from OR to PACU, VSS and O@ sat wnl on 6L per simple mask. Patient denies pain at present. Continue to monitor.

## 2020-10-28 NOTE — H&P
Hospital Medicine History and Physical    10/27/2020    Date of Admission: 10/27/2020    Date of Service: Pt seen/examined on 10/27/2020 and admitted to inpatient. Assessment/plan:  1. Right ureterolithiasis with hydronephrosis. Continue intravenous fluid, Flomax. Strain all urine. As needed pain medications ordered. Continue Rocephin, especially with abnormal urinalysis. Urology has been consulted. N.p.o. after midnight. 2. Infected knee stone. Abnormal urinalysis concerning for complicated UTI in the setting of kidney stone. Continue Rocephin. Follow urine culture result. 3. Elevated blood pressure. Likely secondary to pain. Continue pain. As needed IV labetalol for systolic blood pressure greater than 150 mmHg. 4. Other comorbidities: Obesity with BMI of 37 kg/m². Activities: Up with assist  Prophylaxis: Subcutaneous Lovenox  Code status: Code    ==========================================================  Chief complaint:  Chief Complaint   Patient presents with    Flank Pain     right sided flank pain that goes into the right side of abdomen, difficulty urinating, started couple hours ago, took a laxative, no relief       History of Presenting Illness: This is a pleasant 47 y.o. male who presents to the emergency room with complaints of right flank pain that has been intermittent, onset around 2 PM, with associated nausea, vomiting. About 2 weeks ago, patient had chills, urinary frequency, urgency and hesitancy. CT-abdomen/pelvis obtained in the emergency room reveals 3 mm distal right ureteral stone with associated hydronephrosis. Patient is being admitted for further evaluation and management. Past Medical History:  History reviewed. No pertinent past medical history.     Past Surgical History:      Procedure Laterality Date    COLONOSCOPY      with polyp removal    KNEE ARTHROSCOPY Right     LEG SURGERY Left 07/29/2016    LEFT LOWER LEG FOREIGN BODY REMOVAL WITH FLUOROSCOPIC GUIDANCE       Medications (prior to admission):  Prior to Admission medications    Medication Sig Start Date End Date Taking? Authorizing Provider   atorvastatin (LIPITOR) 40 MG tablet Take 1 tablet by mouth nightly 9/9/20   SUGAR Quezada CNP       Allergy(ies):  Codeine; Percocet [oxycodone-acetaminophen]; and Vicodin [hydrocodone-acetaminophen]    Social History:  TOBACCO:  reports that he has never smoked. He has never used smokeless tobacco.  ETOH:  reports current alcohol use. Family History:      Problem Relation Age of Onset    High Blood Pressure Mother     Other Father         parkinsons       Review of Systems:  Pertinent positives are listed in HPI. At least 10-point ROS reviewed and were negative. Vitals and physical examination:  BP (!) 166/92   Pulse 88   Temp 98.9 °F (37.2 °C) (Oral)   Resp 18   Ht 5' 6\" (1.676 m)   Wt 230 lb (104.3 kg)   SpO2 97%   BMI 37.12 kg/m²   Gen/overall appearance: Not in acute distress. Alert. Oriented x3. Head: Normocephalic, atraumatic  Eyes: EOMI, good acuity  ENT: Oral mucosa moist  Neck: No JVD, thyromegaly  CVS: Nml S1S2, no MRG, RRR  Pulm: Clear bilaterally. No crackles/wheezes  Gastrointestinal: Right flank tender to palpation. Soft, NT/ND, +BS  Musculoskeletal: No edema. Warm  Neuro: No focal deficit. Moves extremity spontaneously. Psychiatry: Appropriate affect. Not agitated. Skin: Warm, dry with normal turgor.  No rash  Capillary refill: Brisk,< 3 seconds   Peripheral Pulses: +2 palpable, equal bilaterally       Labs/imaging/EKG:  CBC:   Recent Labs     10/27/20  1657   WBC 18.7*   HGB 15.2   *     BMP:    Recent Labs     10/27/20  1657      K 4.1      CO2 19*   BUN 16   CREATININE 1.2   GLUCOSE 150*     Hepatic:   Recent Labs     10/27/20  1657   AST 15   ALT 17   BILITOT 0.3   ALKPHOS 88       Ct Abdomen Pelvis Wo Contrast Additional Contrast? None    Result Date: 10/27/2020  EXAMINATION: CT OF THE ABDOMEN AND PELVIS WITHOUT CONTRAST 10/27/2020 4:59 pm TECHNIQUE: CT of the abdomen and pelvis was performed without the administration of intravenous contrast. Multiplanar reformatted images are provided for review. Dose modulation, iterative reconstruction, and/or weight based adjustment of the mA/kV was utilized to reduce the radiation dose to as low as reasonably achievable. COMPARISON: None. HISTORY: ORDERING SYSTEM PROVIDED HISTORY: R flank pain renal colic suspected TECHNOLOGIST PROVIDED HISTORY: Reason for exam:->R flank pain renal colic suspected Additional Contrast?->None Reason for Exam: R flank pain renal colic suspected. Flank Pain (right sided flank pain that goes into the right side of abdomen, difficulty urinating, started couple hours ago, took a laxative, no relief). Acuity: Acute Type of Exam: Initial FINDINGS: Lower Chest: No acute infiltrate at the lung bases. Organs: The unenhanced liver, spleen, pancreas and adrenal glands are unremarkable. No acute biliary findings. 3 mm distal right ureteral calculus with mild hydronephrosis and mild infiltration of the perinephric and periureteric fat. Punctate nonobstructive middle pole left renal calculus. GI/Bowel: Scattered colonic diverticulosis with no acute features. Submucosal fat throughout much of the colon suggesting a remote or chronic inflammatory process. The appendix is unremarkable. No small bowel distension. The stomach and duodenal sweep are unremarkable. Pelvis: No pelvic mass or free pelvic fluid. Mild prostatomegaly. The bladder is contracted. Peritoneum/Retroperitoneum: The abdominal aorta is normal in caliber with minimal atherosclerotic plaque. No retroperitoneal adenopathy or upper abdominal ascites. Bones/Soft Tissues: No acute osseous or soft tissue abnormality. Bilateral fat containing inguinal hernias. 1. 3 mm distal right ureteral calculus with mild hydronephrosis. Nonobstructive left nephrolithiasis.  2. No other acute findings within the abdomen or pelvis. Colonic diverticulosis with no acute features. The appendix is unremarkable. 3. Mild prostatomegaly. Discussed with ER provider.       Thank you SUGAR Alston CNP for the opportunity to be involved in this patient's care.    -----------------------------  Nissa Hernandez MD  [facility]

## 2020-10-28 NOTE — ANESTHESIA PRE PROCEDURE
Department of Anesthesiology  Preprocedure Note       Name:  Yennifer Monk   Age:  47 y.o.  :  1965                                          MRN:  [id number]         Date:  10/28/2020      Surgeon: Miguel A Mancera):  Vanessa Bailey MD    Procedure: Procedure(s):  CYSTOSCOPY RIGHT URETERAL STENT INSERTION    Medications prior to admission:   Prior to Admission medications    Medication Sig Start Date End Date Taking?  Authorizing Provider   atorvastatin (LIPITOR) 40 MG tablet Take 1 tablet by mouth nightly 20   SUGAR Simms - CNP       Current medications:    Current Facility-Administered Medications   Medication Dose Route Frequency Provider Last Rate Last Dose    atorvastatin (LIPITOR) tablet 40 mg  40 mg Oral Nightly Moiz Beyer MD        tamsulosin (FLOMAX) capsule 0.4 mg  0.4 mg Oral Daily Moiz Beyer MD   Stopped at 10/28/20 8886    cefTRIAXone (ROCEPHIN) 1 g in sterile water 10 mL IV syringe  1 g Intravenous Q24H Moiz Beyer MD        0.9 % sodium chloride infusion   Intravenous Continuous Moiz Beyer  mL/hr at 10/27/20 2229      HYDROcodone-acetaminophen (NORCO) 5-325 MG per tablet 1 tablet  1 tablet Oral Q6H PRN Moiz Beyer MD        morphine (PF) injection 2 mg  2 mg Intravenous Q4H PRN Moiz Beyer MD   2 mg at 10/28/20 1154    sodium chloride flush 0.9 % injection 10 mL  10 mL Intravenous 2 times per day Moiz Beyer MD   10 mL at 10/27/20 2213    sodium chloride flush 0.9 % injection 10 mL  10 mL Intravenous PRN Moiz Beyer MD        acetaminophen (TYLENOL) tablet 650 mg  650 mg Oral Q6H PRN Moiz Beyer MD   650 mg at 10/28/20 0009    Or    acetaminophen (TYLENOL) suppository 650 mg  650 mg Rectal Q6H PRN Moiz Beyer MD        polyethylene glycol (GLYCOLAX) packet 17 g  17 g Oral Daily PRN Moiz Beyer MD        promethazine (PHENERGAN) tablet 12.5 mg  12.5 mg Oral Q6H PRN Moiz Beyer MD        Or   Rui Aguilar ondansetron (ZOFRAN) injection 4 mg  4 mg Intravenous Q6H PRN Sheila Mckeon MD   4 mg at 10/28/20 1154    enoxaparin (LOVENOX) injection 40 mg  40 mg Subcutaneous Daily Sehila Mckeon MD   Stopped at 10/28/20 0814    ketorolac (TORADOL) injection 15 mg  15 mg Intravenous Q6H PRN Sheila Mckeon MD   15 mg at 10/28/20 1254       Allergies: Allergies   Allergen Reactions    Codeine Nausea Only    Percocet [Oxycodone-Acetaminophen] Nausea Only    Vicodin [Hydrocodone-Acetaminophen] Nausea Only       Problem List:    Patient Active Problem List   Diagnosis Code    Foreign body (FB) in soft tissue M79.5    Kidney stone N20.0       Past Medical History:  History reviewed. No pertinent past medical history.     Past Surgical History:        Procedure Laterality Date    COLONOSCOPY      with polyp removal    KNEE ARTHROSCOPY Right     LEG SURGERY Left 07/29/2016    LEFT LOWER LEG FOREIGN BODY REMOVAL WITH FLUOROSCOPIC GUIDANCE       Social History:    Social History     Tobacco Use    Smoking status: Never Smoker    Smokeless tobacco: Never Used   Substance Use Topics    Alcohol use: Yes     Frequency: 2-3 times a week     Comment: social                                Counseling given: Not Answered      Vital Signs (Current):   Vitals:    10/28/20 0800 10/28/20 0916 10/28/20 1146 10/28/20 1414   BP: 124/82  131/89 129/77   Pulse: 96  97 98   Resp: 14  14 16   Temp: 98.9 °F (37.2 °C)  99.9 °F (37.7 °C) 98.2 °F (36.8 °C)   TempSrc: Oral  Oral Temporal   SpO2: 99% 96% 99% 95%   Weight:    230 lb (104.3 kg)   Height:    5' 6\" (1.676 m)                                              BP Readings from Last 3 Encounters:   10/28/20 129/77   09/08/20 134/88   09/12/16 110/80       NPO Status:                                                                                 BMI:   Wt Readings from Last 3 Encounters:   10/28/20 230 lb (104.3 kg)   09/08/20 238 lb (108 kg)   09/12/16 239 lb (108.4 kg)     Body mass index is 37.12 kg/m². CBC:   Lab Results   Component Value Date    WBC 23.2 10/28/2020    RBC 3.85 10/28/2020    HGB 12.0 10/28/2020    HCT 35.9 10/28/2020    MCV 93.3 10/28/2020    RDW 14.1 10/28/2020     10/28/2020       CMP:   Lab Results   Component Value Date     10/28/2020    K 4.4 10/28/2020     10/28/2020    CO2 21 10/28/2020    BUN 18 10/28/2020    CREATININE 1.4 10/28/2020    GFRAA >60 10/28/2020    AGRATIO 1.3 10/28/2020    LABGLOM 53 10/28/2020    GLUCOSE 112 10/28/2020    PROT 5.9 10/28/2020    CALCIUM 8.3 10/28/2020    BILITOT 0.5 10/28/2020    ALKPHOS 65 10/28/2020    AST 14 10/28/2020    ALT 13 10/28/2020       POC Tests: No results for input(s): POCGLU, POCNA, POCK, POCCL, POCBUN, POCHEMO, POCHCT in the last 72 hours. Coags: No results found for: PROTIME, INR, APTT    HCG (If Applicable): No results found for: PREGTESTUR, PREGSERUM, HCG, HCGQUANT     ABGs: No results found for: PHART, PO2ART, UNV5SSA, FNA0ZPP, BEART, M0IDEQMO     Type & Screen (If Applicable):  No results found for: LABABO, LABRH    Drug/Infectious Status (If Applicable):  No results found for: HIV, HEPCAB    COVID-19 Screening (If Applicable): No results found for: COVID19      Anesthesia Evaluation  Patient summary reviewed and Nursing notes reviewed no history of anesthetic complications:   Airway: Mallampati: III        Dental: normal exam         Pulmonary:                              Cardiovascular:  Exercise tolerance: good (>4 METS),   (+) hyperlipidemia        Rhythm: regular                      Neuro/Psych:               GI/Hepatic/Renal:   (+) renal disease: kidney stones, morbid obesity          Endo/Other:                     Abdominal:   (+) obese,         Vascular:                                      Anesthesia Plan      general     ASA 2 - emergent       Induction: intravenous.     MIPS: Postoperative opioids intended, Prophylactic antiemetics administered and Postoperative trial extubation. Anesthetic plan and risks discussed with patient. Plan discussed with CRNA.                   Jovanni Roth MD   10/28/2020

## 2020-10-28 NOTE — PROGRESS NOTES
Hospitalist Progress Note      PCP: Chestine Apley, MD    Date of Admission: 10/27/2020    Chief Complaint:   Flank Pain        right sided flank pain that goes into the right side of abdomen, difficulty urinating         Hospital Course: This is a pleasant 47 y.o. male who presents to the emergency room with complaints of right flank pain that has been intermittent, onset around 2 PM, with associated nausea, vomiting. About 2 weeks ago, patient had chills, urinary frequency, urgency and hesitancy. CT-abdomen/pelvis obtained in the emergency room reveals 3 mm distal right ureteral stone with associated hydronephrosis. Patient is being admitted for further evaluation and management. Urology consulted and evaluated patient taken for cystoscopy      Subjective: Unable to see pt as in the OR having procedure      Medications:  Reviewed    Infusion Medications    sodium chloride 150 mL/hr at 10/27/20 2229     Scheduled Medications    atorvastatin  40 mg Oral Nightly    tamsulosin  0.4 mg Oral Daily    cefTRIAXone (ROCEPHIN) IV  1 g Intravenous Q24H    sodium chloride flush  10 mL Intravenous 2 times per day    enoxaparin  40 mg Subcutaneous Daily     PRN Meds: fentanNYL, HYDROcodone 5 mg - acetaminophen, morphine, sodium chloride flush, acetaminophen **OR** acetaminophen, polyethylene glycol, promethazine **OR** ondansetron, ketorolac      Intake/Output Summary (Last 24 hours) at 10/28/2020 1512  Last data filed at 10/28/2020 1202  Gross per 24 hour   Intake 1979 ml   Output 950 ml   Net 1029 ml       Physical Exam Performed:    /77   Pulse 98   Temp 98.2 °F (36.8 °C) (Temporal)   Resp 16   Ht 5' 6\" (1.676 m)   Wt 230 lb (104.3 kg)   SpO2 95%   BMI 37.12 kg/m²     General appearance: No apparent distress, appears stated age and cooperative. HEENT: Pupils equal, round, and reactive to light. Conjunctivae/corneas clear. Neck: Supple, with full range of motion.  No jugular venous distention. Trachea midline. Respiratory:  Normal respiratory effort. Clear to auscultation, bilaterally without Rales/Wheezes/Rhonchi. Cardiovascular: Regular rate and rhythm with normal S1/S2 without murmurs, rubs or gallops. Abdomen: Soft, non-tender, non-distended with normal bowel sounds. Musculoskeletal: No clubbing, cyanosis or edema bilaterally. Full range of motion without deformity. Skin: Skin color, texture, turgor normal.  No rashes or lesions. Neurologic:  Neurovascularly intact without any focal sensory/motor deficits. Cranial nerves: II-XII intact, grossly non-focal.  Psychiatric: Alert and oriented, thought content appropriate, normal insight  Capillary Refill: Brisk,< 3 seconds   Peripheral Pulses: +2 palpable, equal bilaterally       Labs:   Recent Labs     10/27/20  1657 10/28/20  0554   WBC 18.7* 23.2*   HGB 15.2 12.0*   HCT 44.5 35.9*   * 369     Recent Labs     10/27/20  1657 10/28/20  0554    137   K 4.1 4.4    106   CO2 19* 21   BUN 16 18   CREATININE 1.2 1.4*   CALCIUM 9.6 8.3   PHOS  --  4.4     Recent Labs     10/27/20  1657 10/28/20  0554   AST 15 14*   ALT 17 13   BILITOT 0.3 0.5   ALKPHOS 88 65     No results for input(s): INR in the last 72 hours. No results for input(s): Tracee Lowers in the last 72 hours. Urinalysis:      Lab Results   Component Value Date    NITRU Negative 10/27/2020    WBCUA 67 10/27/2020    BACTERIA RARE 10/27/2020    RBCUA 2 10/27/2020    BLOODU SMALL 10/27/2020    SPECGRAV 1.022 10/27/2020    GLUCOSEU Negative 10/27/2020    GLUCOSEU NEGATIVE 09/06/2011       Radiology:  CT ABDOMEN PELVIS WO CONTRAST Additional Contrast? None   Final Result   1. 3 mm distal right ureteral calculus with mild hydronephrosis. Nonobstructive left nephrolithiasis. 2. No other acute findings within the abdomen or pelvis. Colonic   diverticulosis with no acute features. The appendix is unremarkable. 3. Mild prostatomegaly.          FL [employer]ROGRADE

## 2020-10-28 NOTE — CARE COORDINATION
Patient admitted with an anticipated short hospitalization length of stay. Chart reviewed and it appears that patient has minimal needs for discharge at this time. Discussed with patients nurse and requested that case management be notified if discharge needs are identified. *Case management will continue to follow progress and update discharge plan as needed.     Electronically signed by ENRIKE Hollingsworth on 10/28/2020 at 8:25 AM

## 2020-10-28 NOTE — PROGRESS NOTES
Consent signed and placed on chart. Pt instructed to change into gown and to remove everything under the gown and to remove and jewelry. Verbalized understanding.

## 2020-10-28 NOTE — PROGRESS NOTES
Patient was admitted back to room 473 following procedure. Patient's vitals have remained stable and patient denies pain at this time. Sttod at edge of bed and has been able to void post procedure.

## 2020-10-28 NOTE — OP NOTE
Urology Operative Note  [facility]     Patient: Murtaza Becerril MRN: [id number]  Room/Bed: [facility]6825/7899-10   YOB: 1965  Age/Sex: 47 y.o.male  Admission Date: 10/27/2020     Date of Operation: 10/28/2020    Preoperative Diagnosis: RIGHT side stone (SIZE = 3 mm; LOCATION = Distal ureter) with possible urinary tract infection, leukocytosis    Postoperative Diagnosis: same    Procedure:    1. Cystoscopy with RIGHT side ureteral stent placement (6fr x 26cm JJ ureteral stent, no string) and RIGHT retrograde pyelogram  2. Fluoroscopic imaging < 1 hr physician time    Surgeon:   Wilma Bailey MD, HELLEN    Anesthesia: Ashlie Schmitt    Indications: Murtaza Becerril is a 47 y.o. male who presents for the above named surgery. Informed consent was obtained and the risks, benefits, and details of the procedure were explained to the patient who elected to proceed. Details of Procedure: The patient was brought to the operating room and placed in the supine position on the operating room table. SCDs were placed on the lower extremities. Following induction of anesthesia the patient was positioned in a lithotomy position, all pressure points were padded, and the genitals were prepped and draped in the usual sterile fashion. A routine timeout was performed, confirming the patient, procedure, site, risk of fire, patient allergies and confirming that preoperative antibiotics had been administered prior to beginning. A 21 fr rigid cystoscope was advance via a normal appearing urethra into the bladder. The bladder was inspected and there were no suspicious lesions, stones or diverticula seen. Attention was turned to the right ureteral orifice. A 0.035 sensor wire was advance into the right UO and up to the renal pelvis under control of fluoroscopy. There was slight resistance felt at the expected level of the stone based on the CT scan.  Over the wire a 5 Croatian open ended catheter was positioned proximal to the level of the stone. The wire was removed and a RUPG was done. The wire was replaced proximal to the stone, and the open ended catheter was removed. Over the wire a stent was advanced under control of fluoroscopy with good curl in the renal pelvis and the urinary bladder. The bladder was emptied and the scope removed    At the end of the procedure all counts were correct. The patient tolerated the procedure well and was transported to the PACU in stable condition.     Findings: left sided stone as above    Estimated Blood Loss: minimal                  Drains: 6fr x 26cm right ureteral stent (no string)          Specimens: none    Complications: none apparent           Disposition:  PACU - stable    Plan: Followup for definitive stone treatment           Electronically signed by: Marlin Casper MD, HELLEN, 10/28/2020  [facility]  Office Contact: [phone]

## 2020-10-28 NOTE — ANESTHESIA POSTPROCEDURE EVALUATION
Department of Anesthesiology  Postprocedure Note    Patient: Coretta Henderson  MRN: [id number]  YOB: 1965  Date of evaluation: 10/28/2020  Time:  6:17 PM     Procedure Summary     Date:  10/28/20 Room / Location:  [address] [place] [address]    Anesthesia Start:  6976 Anesthesia Stop:  0108    Procedure:  CYSTOSCOPY RIGHT URETERAL STENT INSERTION (Right ) Diagnosis:       Ureteral calculi      (RIGHT Ureteral calculi [N20.1])    Surgeon:  Patience Powell MD Responsible Provider:  Latoya Christopher MD    Anesthesia Type:  general ASA Status:  2 - Emergent          Anesthesia Type: general    Radha Phase I: Radha Score: 10    Radha Phase II:      Last vitals: Reviewed and per EMR flowsheets.        Anesthesia Post Evaluation    Patient location during evaluation: PACU  Complications: no  Cardiovascular status: hemodynamically stable  Respiratory status: acceptable

## 2020-10-28 NOTE — ED NOTES
Pt resting in bed, states that his pain is a 10/10, respiration easy, even, and unlabored. No s/s of distress. Alert and orientated. Family at the bedside. Abdomen soft and non distended.       Manning Gitelman, RN  10/28/20 2951

## 2020-10-28 NOTE — CONSULTS
Urology Consult Note  [facility]     Patient: Mikayla Lang MRN: [id number]  Room/Bed: [facility]1961/0126-93   YOB: 1965  Age/Sex: 47 y.o.male  Admission Date: 10/27/2020     Date of Service:  10/28/2020    Consulting Physician: Candido Gan  Admitting/Requesting Physician: Maximo Edgar MD  Primary Care Physician: Serenity Rubio MD    Reason for Consult: Right distal ureteral stone with possible UTI    ASSESSMENT/PLAN     Right distal ureteral stone with possible UTI  Leukocystosis  Nonobstructing left renal stone  No prior urologic history  Recent left shoulder surgery    Recommendations: To OR for cystoscopy, right ureteral stent RBA DWP  Cont IVF/IV abx, fu urine culture  Monitor postop, will need to go home with complete 7 day course antibiotics  Definitive stone treatment to follow with ureteroscopy    All patient questions were answered. He understands the plan as listed above. HISTORY     Chief Complaint:   Chief Complaint   Patient presents with    Flank Pain     right sided flank pain that goes into the right side of abdomen, difficulty urinating, started couple hours ago, took a laxative, no relief       History of Present Illness: Mikyala Lang is a 47 y.o. male with right ureteral stone. Onset of symptoms was acute with worsening course since that time. Symptoms are aggravated by nothing. Symptoms improved with IV pan meds. Associated symptoms include nausea. Patient also reports no prior stone hx. He has tried the following treatments: IV abx    Past Medical History:  He has no past medical history on file. Hospital Problem List:  Active Problems:    Kidney stone  Resolved Problems:    * No resolved hospital problems. *      Past Surgical History:  He has a past surgical history that includes Colonoscopy; Knee arthroscopy (Right); and Leg Surgery (Left, 07/29/2016). Social History:  He reports that he has never smoked.  He has never used smokeless tobacco. He reports current alcohol use. He reports previous drug use. Drug: Marijuana. Family History:  family history includes High Blood Pressure in his mother; Other in his father. Allergies: Allergies   Allergen Reactions    Codeine Nausea Only    Percocet [Oxycodone-Acetaminophen] Nausea Only    Vicodin [Hydrocodone-Acetaminophen] Nausea Only       Medications:  Scheduled Meds:   atorvastatin  40 mg Oral Nightly    tamsulosin  0.4 mg Oral Daily    cefTRIAXone (ROCEPHIN) IV  1 g Intravenous Q24H    sodium chloride flush  10 mL Intravenous 2 times per day    enoxaparin  40 mg Subcutaneous Daily     Continuous Infusions:   sodium chloride 150 mL/hr at 10/27/20 2229     PRN Meds:HYDROcodone 5 mg - acetaminophen, morphine, sodium chloride flush, acetaminophen **OR** acetaminophen, polyethylene glycol, promethazine **OR** ondansetron, ketorolac    Review of Systems:  Pertinent positives/negatives reviewed in HPI. All other systems reviewed and negative, unless noted below. Constitutional: Negative  Genitourinary: + flank pain see HPI  HEENT: Negative   Cardiovascular: Negative   Respiratory: Negative   Gastrointestinal: Negative   Musculoskeletal: Negative   Neurological: Negative   Psychiatric: Negative   Integumentary: Negative     PHYSICAL EXAM     Vitals:    10/28/20 0500   BP: 118/74   Pulse: 96   Resp: 16   Temp: 98.3 °F (36.8 °C)   SpO2: 95%     CONSTITUTIONAL: The patient is well nourished/developed, with mod distress noted. NEUROLOGICAL/PSYCHIATRIC: Oriented to place and time, normal affected noted. NECK: The neck is symmetrical and supple, with no masses noted. CARDIOVASCULAR: Regular rate and rhythm, no evidence of swelling noted. RESPIRATORY: Normal respiratory effort with no wheezing noted. ABDOMEN: Abdomen soft, non-tender, non-distended. No enlarged liver or spleen. No hernias noted. Stool occult blood not indicated.    SKIN: Skin appears normal.  LYMPHATICS: No adenopathy noted. CVA: No CVA tenderness bilaterally. GENITOURINARY: The penis is without rash or lesions and meatus with expected size and location. The scrotum appears normal. Bilateral testicles appears to be of normal size and location. No masses or tenderness noted of testicles or epididymis. DOUGLAS: Deferred. Ins/Outs:    Intake/Output Summary (Last 24 hours) at 10/28/2020 0742  Last data filed at 10/28/2020 0728  Gross per 24 hour   Intake 980 ml   Output 950 ml   Net 30 ml       LABS     CBC   Lab Results   Component Value Date    WBC 23.2 10/28/2020    RBC 3.85 10/28/2020    HGB 12.0 10/28/2020    HCT 35.9 10/28/2020    MCV 93.3 10/28/2020    MCH 31.1 10/28/2020    MCHC 33.3 10/28/2020    RDW 14.1 10/28/2020     10/28/2020    MPV 7.6 10/28/2020     BMP   Lab Results   Component Value Date     10/28/2020    K 4.4 10/28/2020     10/28/2020    CO2 21 10/28/2020    BUN 18 10/28/2020    CREATININE 1.4 10/28/2020    GLUCOSE 112 10/28/2020    CALCIUM 8.3 10/28/2020     Urinalysis:   Lab Results   Component Value Date    COLORU YELLOW 10/27/2020    GLUCOSEU Negative 10/27/2020    GLUCOSEU NEGATIVE 09/06/2011    BLOODU SMALL 10/27/2020    NITRU Negative 10/27/2020    LEUKOCYTESUR MODERATE 10/27/2020     Urine culture: No results for input(s): Naila Brooms in the last 72 hours. PSA: No results found for: PSA      IMAGING     Ct Abdomen Pelvis Wo Contrast Additional Contrast? None    Result Date: 10/27/2020  EXAMINATION: CT OF THE ABDOMEN AND PELVIS WITHOUT CONTRAST 10/27/2020 4:59 pm TECHNIQUE: CT of the abdomen and pelvis was performed without the administration of intravenous contrast. Multiplanar reformatted images are provided for review. Dose modulation, iterative reconstruction, and/or weight based adjustment of the mA/kV was utilized to reduce the radiation dose to as low as reasonably achievable. COMPARISON: None.  HISTORY: ORDERING SYSTEM PROVIDED HISTORY: R flank pain renal colic suspected TECHNOLOGIST PROVIDED HISTORY: Reason for exam:->R flank pain renal colic suspected Additional Contrast?->None Reason for Exam: R flank pain renal colic suspected. Flank Pain (right sided flank pain that goes into the right side of abdomen, difficulty urinating, started couple hours ago, took a laxative, no relief). Acuity: Acute Type of Exam: Initial FINDINGS: Lower Chest: No acute infiltrate at the lung bases. Organs: The unenhanced liver, spleen, pancreas and adrenal glands are unremarkable. No acute biliary findings. 3 mm distal right ureteral calculus with mild hydronephrosis and mild infiltration of the perinephric and periureteric fat. Punctate nonobstructive middle pole left renal calculus. GI/Bowel: Scattered colonic diverticulosis with no acute features. Submucosal fat throughout much of the colon suggesting a remote or chronic inflammatory process. The appendix is unremarkable. No small bowel distension. The stomach and duodenal sweep are unremarkable. Pelvis: No pelvic mass or free pelvic fluid. Mild prostatomegaly. The bladder is contracted. Peritoneum/Retroperitoneum: The abdominal aorta is normal in caliber with minimal atherosclerotic plaque. No retroperitoneal adenopathy or upper abdominal ascites. Bones/Soft Tissues: No acute osseous or soft tissue abnormality. Bilateral fat containing inguinal hernias. 1. 3 mm distal right ureteral calculus with mild hydronephrosis. Nonobstructive left nephrolithiasis. 2. No other acute findings within the abdomen or pelvis. Colonic diverticulosis with no acute features. The appendix is unremarkable. 3. Mild prostatomegaly.             Electronically signed by: Eduarda Chatman MD, HELLEN 10/28/2020   [facility]  Office Contact: [phone]

## 2020-10-28 NOTE — PROGRESS NOTES
Pt in room, wife at bedside. Pt states he had left bicep sx about 4 weeks ago, no lifting or BP to be taken on that side.

## 2020-10-29 VITALS
SYSTOLIC BLOOD PRESSURE: 130 MMHG | OXYGEN SATURATION: 96 % | WEIGHT: 230 LBS | BODY MASS INDEX: 36.96 KG/M2 | HEIGHT: 66 IN | TEMPERATURE: 98 F | DIASTOLIC BLOOD PRESSURE: 75 MMHG | HEART RATE: 99 BPM | RESPIRATION RATE: 16 BRPM

## 2020-10-29 LAB
A/G RATIO: 1.2 (ref 1.1–2.2)
ALBUMIN SERPL-MCNC: 3.4 G/DL (ref 3.4–5)
ALP BLD-CCNC: 67 U/L (ref 40–129)
ALT SERPL-CCNC: 11 U/L (ref 10–40)
ANION GAP SERPL CALCULATED.3IONS-SCNC: 8 MMOL/L (ref 3–16)
AST SERPL-CCNC: 13 U/L (ref 15–37)
BILIRUB SERPL-MCNC: 0.4 MG/DL (ref 0–1)
BUN BLDV-MCNC: 17 MG/DL (ref 7–20)
CALCIUM SERPL-MCNC: 8.8 MG/DL (ref 8.3–10.6)
CHLORIDE BLD-SCNC: 105 MMOL/L (ref 99–110)
CO2: 22 MMOL/L (ref 21–32)
CREAT SERPL-MCNC: 1.1 MG/DL (ref 0.9–1.3)
GFR AFRICAN AMERICAN: >60
GFR NON-AFRICAN AMERICAN: >60
GLOBULIN: 2.9 G/DL
GLUCOSE BLD-MCNC: 131 MG/DL (ref 70–99)
MAGNESIUM: 2.2 MG/DL (ref 1.8–2.4)
ORGANISM: ABNORMAL
PHOSPHORUS: 3.3 MG/DL (ref 2.5–4.9)
POTASSIUM SERPL-SCNC: 4.7 MMOL/L (ref 3.5–5.1)
SODIUM BLD-SCNC: 135 MMOL/L (ref 136–145)
TOTAL PROTEIN: 6.3 G/DL (ref 6.4–8.2)
URINE CULTURE, ROUTINE: ABNORMAL

## 2020-10-29 PROCEDURE — 80053 COMPREHEN METABOLIC PANEL: CPT

## 2020-10-29 PROCEDURE — 6360000002 HC RX W HCPCS: Performed by: UROLOGY

## 2020-10-29 PROCEDURE — 84100 ASSAY OF PHOSPHORUS: CPT

## 2020-10-29 PROCEDURE — 6370000000 HC RX 637 (ALT 250 FOR IP): Performed by: UROLOGY

## 2020-10-29 PROCEDURE — 36415 COLL VENOUS BLD VENIPUNCTURE: CPT

## 2020-10-29 PROCEDURE — 83735 ASSAY OF MAGNESIUM: CPT

## 2020-10-29 PROCEDURE — 2580000003 HC RX 258: Performed by: UROLOGY

## 2020-10-29 RX ORDER — CEFDINIR 300 MG/1
300 CAPSULE ORAL 2 TIMES DAILY
Qty: 14 CAPSULE | Refills: 0 | Status: SHIPPED | OUTPATIENT
Start: 2020-10-29 | End: 2020-11-05

## 2020-10-29 RX ORDER — HYDROCODONE BITARTRATE AND ACETAMINOPHEN 5; 325 MG/1; MG/1
1 TABLET ORAL EVERY 6 HOURS PRN
Qty: 12 TABLET | Refills: 0 | Status: SHIPPED | OUTPATIENT
Start: 2020-10-29 | End: 2020-11-01

## 2020-10-29 RX ORDER — TAMSULOSIN HYDROCHLORIDE 0.4 MG/1
0.4 CAPSULE ORAL DAILY
Qty: 30 CAPSULE | Refills: 3 | Status: SHIPPED | OUTPATIENT
Start: 2020-10-30 | End: 2022-03-01

## 2020-10-29 RX ORDER — ONDANSETRON 4 MG/1
4 TABLET, FILM COATED ORAL DAILY PRN
Qty: 10 TABLET | Refills: 0 | Status: SHIPPED | OUTPATIENT
Start: 2020-10-29 | End: 2022-03-01

## 2020-10-29 RX ADMIN — TAMSULOSIN HYDROCHLORIDE 0.4 MG: 0.4 CAPSULE ORAL at 08:10

## 2020-10-29 RX ADMIN — Medication 10 ML: at 08:10

## 2020-10-29 RX ADMIN — HYDROCODONE BITARTRATE AND ACETAMINOPHEN 1 TABLET: 5; 325 TABLET ORAL at 07:19

## 2020-10-29 RX ADMIN — ENOXAPARIN SODIUM 40 MG: 40 INJECTION SUBCUTANEOUS at 08:10

## 2020-10-29 RX ADMIN — ONDANSETRON 4 MG: 2 INJECTION INTRAMUSCULAR; INTRAVENOUS at 07:19

## 2020-10-29 ASSESSMENT — PAIN DESCRIPTION - ORIENTATION: ORIENTATION: RIGHT

## 2020-10-29 ASSESSMENT — PAIN DESCRIPTION - PAIN TYPE: TYPE: ACUTE PAIN

## 2020-10-29 ASSESSMENT — PAIN SCALES - GENERAL
PAINLEVEL_OUTOF10: 4
PAINLEVEL_OUTOF10: 6

## 2020-10-29 ASSESSMENT — PAIN DESCRIPTION - ONSET: ONSET: ON-GOING

## 2020-10-29 ASSESSMENT — PAIN DESCRIPTION - FREQUENCY: FREQUENCY: INTERMITTENT

## 2020-10-29 ASSESSMENT — PAIN DESCRIPTION - LOCATION: LOCATION: ABDOMEN

## 2020-10-29 ASSESSMENT — PAIN DESCRIPTION - PROGRESSION: CLINICAL_PROGRESSION: GRADUALLY IMPROVING

## 2020-10-29 ASSESSMENT — PAIN DESCRIPTION - DESCRIPTORS: DESCRIPTORS: SHARP

## 2020-10-29 NOTE — PLAN OF CARE
Problem: Pain:  Goal: Pain level will decrease  Description: Pain level will decrease  10/29/2020 0918 by Naye Wilkinson RN  Outcome: Ongoing  10/29/2020 0254 by Catracho Chicas RN  Outcome: Ongoing  Goal: Control of acute pain  Description: Control of acute pain  10/29/2020 0918 by Naey Wilkinson RN  Outcome: Ongoing  10/29/2020 0254 by Catracho Chicas RN  Outcome: Ongoing  Goal: Control of chronic pain  Description: Control of chronic pain  10/29/2020 0918 by Naye Wilkinson RN  Outcome: Ongoing  10/29/2020 0254 by Catracho Chicas RN  Outcome: Ongoing

## 2020-10-29 NOTE — DISCHARGE SUMMARY
Hospital Medicine Discharge Summary    Patient ID: Genette Viviana      Patient's PCP: Bora Concepcion MD    Admit Date: 10/27/2020     Discharge Date:   10/29/2020    Admitting Physician: Quinn Healy MD     Discharge Physician: SUGAR Rivero - CNP     Discharge Diagnoses: Active Hospital Problems    Diagnosis    Pyelonephritis [N12]    Sepsis ([address] [A41.9]    Kidney stone [N20.0]       The patient was seen and examined on day of discharge and this discharge summary is in conjunction with any daily progress note from day of discharge. Hospital Course: This is a pleasant 54 y. o. male who presents to the emergency room with complaints of right flank pain that has been intermittent, onset around 2 PM, with associated nausea, vomiting.  About 2 weeks ago, patient had chills, urinary frequency, urgency and hesitancy.  CT-abdomen/pelvis obtained in the emergency room reveals 3 mm distal right ureteral stone with associated hydronephrosis.  Patient is being admitted for further evaluation and management.     Urology consulted and evaluated patient taken for cystoscopy    Sepsis: Present on admission- resolved   -Etiology pyelonephritis  -Met sepsis criteria with leukocytosis, fever and mild tachycardia  -Pancultured and started on broad-spectrum antibiotics in the ED  -Continued supportive care and monitoring     Kidney stone  -CT of abdomen pelvis as above  -Urology consulted underwent cystoscopy  - f/u as OP      Pyelonephritis  -Urine positive for E. coli   -ceftriaxone > Po at discharge     Physical Exam Performed:     /75   Pulse 99   Temp 98 °F (36.7 °C) (Oral)   Resp 16   Ht 5' 6\" (1.676 m)   Wt 230 lb (104.3 kg)   SpO2 96%   BMI 37.12 kg/m²       General appearance:  No apparent distress, appears stated age and cooperative. HEENT:  Normal cephalic, atraumatic without obvious deformity. Pupils equal, round, and reactive to light. Extra ocular muscles intact. Conjunctivae/corneas clear. Neck: Supple, with full range of motion. No jugular venous distention. Trachea midline. Respiratory:  Normal respiratory effort. Clear to auscultation, bilaterally without Rales/Wheezes/Rhonchi. Cardiovascular:  Regular rate and rhythm with normal S1/S2 without murmurs, rubs or gallops. Abdomen: Soft, non-tender, non-distended with normal bowel sounds. Musculoskeletal:  No clubbing, cyanosis or edema bilaterally. Full range of motion without deformity. Skin: Skin color, texture, turgor normal.  No rashes or lesions. Neurologic:  Neurovascularly intact without any focal sensory/motor deficits. Cranial nerves: II-XII intact, grossly non-focal.  Psychiatric:  Alert and oriented, thought content appropriate, normal insight  Capillary Refill: Brisk,< 3 seconds   Peripheral Pulses: +2 palpable, equal bilaterally       Labs: For convenience and continuity at follow-up the following most recent labs are provided:      CBC:    Lab Results   Component Value Date    WBC 23.2 10/28/2020    HGB 12.0 10/28/2020    HCT 35.9 10/28/2020     10/28/2020       Renal:    Lab Results   Component Value Date     10/29/2020    K 4.7 10/29/2020     10/29/2020    CO2 22 10/29/2020    BUN 17 10/29/2020    CREATININE 1.1 10/29/2020    CALCIUM 8.8 10/29/2020    PHOS 3.3 10/29/2020         Significant Diagnostic Studies    Radiology:   [facility] RETROGRADE PYELOGRAM RIGHT   Final Result   Intraprocedural fluoroscopic spot images as above. See separate procedure   report for more information. CT ABDOMEN PELVIS WO CONTRAST Additional Contrast? None   Final Result   1. 3 mm distal right ureteral calculus with mild hydronephrosis. Nonobstructive left nephrolithiasis. 2. No other acute findings within the abdomen or pelvis. Colonic   diverticulosis with no acute features. The appendix is unremarkable. 3. Mild prostatomegaly.                 Consults:     IP CONSULT TO UROLOGY  IP CONSULT TO HOSPITALIST  IP CONSULT TO UROLOGY    Disposition:  Home     Condition at Discharge: Stable    Discharge Instructions/Follow-up:  As above     Code Status:  Full Code     Activity: activity as tolerated    Diet: regular diet      Discharge Medications:     Current Discharge Medication List           Details   atorvastatin (LIPITOR) 40 MG tablet Take 1 tablet by mouth nightly  Qty: 90 tablet, Refills: 1    Associated Diagnoses: Mixed hyperlipidemia             Time Spent on discharge is more than 30 minutes in the examination, evaluation, counseling and review of medications and discharge plan. Signed:    SUGAR Good - CNP   10/29/2020      Thank you Denisse Terrazas MD for the opportunity to be involved in this patient's care. If you have any questions or concerns please feel free to contact me at [phone].

## 2020-10-29 NOTE — PROGRESS NOTES
Shift assessment completed, pt is alert and oriented, independent in room, call light within reach. Norco for pain, see flowsheets and LDA. Will monitor pt. The care plan and education has been reviewed and mutually agreed upon with the patient.

## 2020-10-29 NOTE — PROGRESS NOTES
Urology Progress Note  [facility]     Patient: Marcia Vasquez MRN: [id number]  Room/Bed: [facility]8553/8148-32   YOB: 1965  Age/Sex: 47 y.o.male  Admission Date: 10/27/2020     Date of Service:  10/29/2020    ASSESSMENT/PLAN     Right distal ureteral stone with possible UTI  Leukocystosis  Nonobstructing left renal stone  No prior urologic history  Recent left shoulder surgery    Now POD1 R stent     Recommendations:  Ok for dc per urology  Empiric abx x 7 days, fu urine culture and adjust accordingly  FU 1 week in office  Definitive stone treatment to follow with ureteroscopy    All patient questions were answered. He understands the plan as listed above. SUBJECTIVE     Chief Complaint:   Chief Complaint   Patient presents with    Flank Pain     right sided flank pain that goes into the right side of abdomen, difficulty urinating, started couple hours ago, took a laxative, no relief       24 Hour Events: Today patient reports marked improvement in pain. Otherwise symptoms are overall improved and pain is adequately controlled. Denies nausea/vomiting. No other genitourinary symptoms. OBJECTIVE     Hospital Problem List:  Principal Problem:    Pyelonephritis  Active Problems:    Kidney stone    Sepsis ([facility] [address]  Resolved Problems:    * No resolved hospital problems. *      Physical Exam:  Vitals:    10/28/20 2320   BP: 115/74   Pulse: 79   Resp: 16   Temp: 98.2 °F (36.8 °C)   SpO2: 97%     CONSTITUTIONAL: The patient is well nourished/developed, with no distress noted. NEUROLOGICAL/PSYCHIATRIC: Oriented to place and time, normal affected noted. CARDIOVASCULAR: Regular rate and rhythm, no evidence of swelling noted. RESPIRATORY: Normal respiratory effort with no wheezing noted. ABDOMEN: Abdomen soft, non-tender, non-distended. No enlarged liver or spleen. No hernias noted. GENITOURINARY: No CVA tenderness bilaterally.      Ins/Outs:    Intake/Output Summary (Last 24 hours) at 10/29/2020 0744  Last data filed at 10/28/2020 2318  Gross per 24 hour   Intake 1119 ml   Output 1200 ml   Net -81 ml       Labs:  CBC   Lab Results   Component Value Date    WBC 23.2 10/28/2020    RBC 3.85 10/28/2020    HGB 12.0 10/28/2020    HCT 35.9 10/28/2020    MCV 93.3 10/28/2020    MCH 31.1 10/28/2020    MCHC 33.3 10/28/2020    RDW 14.1 10/28/2020     10/28/2020    MPV 7.6 10/28/2020     BMP   Lab Results   Component Value Date     10/29/2020    K 4.7 10/29/2020     10/29/2020    CO2 22 10/29/2020    BUN 17 10/29/2020    CREATININE 1.1 10/29/2020    GLUCOSE 131 10/29/2020    CALCIUM 8.8 10/29/2020     Urinalysis:   Lab Results   Component Value Date    COLORU YELLOW 10/27/2020    GLUCOSEU Negative 10/27/2020    GLUCOSEU NEGATIVE 09/06/2011    BLOODU SMALL 10/27/2020    NITRU Negative 10/27/2020    LEUKOCYTESUR MODERATE 10/27/2020     Urine culture:   Recent Labs     10/27/20  1657   LABURIN >100,000 CFU/ml     PSA: No results found for: PSA     Imaging:  Ct Abdomen Pelvis Wo Contrast Additional Contrast? None    Result Date: 10/28/2020  EXAMINATION: CT OF THE ABDOMEN AND PELVIS WITHOUT CONTRAST 10/27/2020 4:59 pm TECHNIQUE: CT of the abdomen and pelvis was performed without the administration of intravenous contrast. Multiplanar reformatted images are provided for review. Dose modulation, iterative reconstruction, and/or weight based adjustment of the mA/kV was utilized to reduce the radiation dose to as low as reasonably achievable. COMPARISON: None. HISTORY: ORDERING SYSTEM PROVIDED HISTORY: R flank pain renal colic suspected TECHNOLOGIST PROVIDED HISTORY: Reason for exam:->R flank pain renal colic suspected Additional Contrast?->None Reason for Exam: R flank pain renal colic suspected. Flank Pain (right sided flank pain that goes into the right side of abdomen, difficulty urinating, started couple hours ago, took a laxative, no relief).  Acuity: Acute Type of Exam: Initial FINDINGS: Lower Chest: No acute infiltrate at the lung bases. Organs: The unenhanced liver, spleen, pancreas and adrenal glands are unremarkable. No acute biliary findings. 3 mm distal right ureteral calculus with mild hydronephrosis and mild infiltration of the perinephric and periureteric fat. Punctate nonobstructive middle pole left renal calculus. GI/Bowel: Scattered colonic diverticulosis with no acute features. Submucosal fat throughout much of the colon suggesting a remote or chronic inflammatory process. The appendix is unremarkable. No small bowel distension. The stomach and duodenal sweep are unremarkable. Pelvis: No pelvic mass or free pelvic fluid. Mild prostatomegaly. The bladder is contracted. Peritoneum/Retroperitoneum: The abdominal aorta is normal in caliber with minimal atherosclerotic plaque. No retroperitoneal adenopathy or upper abdominal ascites. Bones/Soft Tissues: No acute osseous or soft tissue abnormality. Bilateral fat containing inguinal hernias. 1. 3 mm distal right ureteral calculus with mild hydronephrosis. Nonobstructive left nephrolithiasis. 2. No other acute findings within the abdomen or pelvis. Colonic diverticulosis with no acute features. The appendix is unremarkable. 3. Mild prostatomegaly. Fl Retrograde Pyelogram Right    Result Date: 10/28/2020  EXAMINATION: SPOT FLUOROSCOPIC IMAGES 10/28/2020 4:01 pm TECHNIQUE: Fluoroscopy was provided by the radiology department for procedure. Radiologist was not present during examination. FLUOROSCOPY DOSE AND TYPE OR TIME AND EXPOSURES: 0.1 minutes COMPARISON: None HISTORY: ORDERING SYSTEM PROVIDED HISTORY: Stent TECHNOLOGIST PROVIDED HISTORY: Reason for exam:->Stent Reason for Exam: CYSTOSCOPY RIGHT URETERAL STENT INSERTION Acuity: Unknown Type of Exam: Unknown Intraprocedural imaging. FINDINGS: 10 spot images of the abdomen were obtained.  Procedural radiography was performed during right retrograde pyelography and stent placement

## 2020-10-29 NOTE — PROGRESS NOTES
8894: Shift assessment complete. VSS. Alert and oriented x4. See flowsheets. Morning medications given per MAR. The care plan and education has been reviewed and mutually agreed upon with the patient. Pt needs expressed, call light within reach, will continue to monitor.

## 2021-01-19 ENCOUNTER — TELEPHONE (OUTPATIENT)
Dept: INTERNAL MEDICINE CLINIC | Age: 56
End: 2021-01-19

## 2021-01-19 DIAGNOSIS — E78.2 MIXED HYPERLIPIDEMIA: ICD-10-CM

## 2021-01-19 RX ORDER — ATORVASTATIN CALCIUM 40 MG/1
40 TABLET, FILM COATED ORAL NIGHTLY
Qty: 90 TABLET | Refills: 1 | Status: SHIPPED | OUTPATIENT
Start: 2021-01-19 | End: 2022-03-18

## 2021-01-19 NOTE — TELEPHONE ENCOUNTER
Patient's wife called regarding prescription for atorvastatin (LIPITOR) 40 MG tablet. She states [address] [place] [address] advised patient his insurance would not cover medication unless prescription is for 90 days. It was last written by Violette Garcia 9/9/20 for 90 tablets with 1 refill. I did explain this to patient's wife. She is not sure if pharmacy was giving him 30 tablets instead of ninety. She requested a new prescription for 90 day supply be sent to [facility].

## 2021-03-17 ENCOUNTER — OFFICE VISIT (OUTPATIENT)
Dept: INTERNAL MEDICINE CLINIC | Age: 56
End: 2021-03-17
Payer: COMMERCIAL

## 2021-03-17 VITALS
SYSTOLIC BLOOD PRESSURE: 118 MMHG | WEIGHT: 243.6 LBS | TEMPERATURE: 96.8 F | HEART RATE: 93 BPM | DIASTOLIC BLOOD PRESSURE: 80 MMHG | HEIGHT: 66 IN | BODY MASS INDEX: 39.15 KG/M2

## 2021-03-17 DIAGNOSIS — R20.2 TINGLING: Primary | ICD-10-CM

## 2021-03-17 DIAGNOSIS — R20.2 TINGLING: ICD-10-CM

## 2021-03-17 LAB
BASOPHILS ABSOLUTE: 0 K/UL (ref 0–0.2)
BASOPHILS RELATIVE PERCENT: 0.6 %
EOSINOPHILS ABSOLUTE: 0.1 K/UL (ref 0–0.6)
EOSINOPHILS RELATIVE PERCENT: 1.5 %
HCT VFR BLD CALC: 45.6 % (ref 40.5–52.5)
HEMOGLOBIN: 15.5 G/DL (ref 13.5–17.5)
LYMPHOCYTES ABSOLUTE: 3.2 K/UL (ref 1–5.1)
LYMPHOCYTES RELATIVE PERCENT: 36.6 %
MCH RBC QN AUTO: 30.8 PG (ref 26–34)
MCHC RBC AUTO-ENTMCNC: 34.1 G/DL (ref 31–36)
MCV RBC AUTO: 90.5 FL (ref 80–100)
MONOCYTES ABSOLUTE: 0.8 K/UL (ref 0–1.3)
MONOCYTES RELATIVE PERCENT: 8.7 %
NEUTROPHILS ABSOLUTE: 4.6 K/UL (ref 1.7–7.7)
NEUTROPHILS RELATIVE PERCENT: 52.6 %
PDW BLD-RTO: 14 % (ref 12.4–15.4)
PLATELET # BLD: 236 K/UL (ref 135–450)
PMV BLD AUTO: 8.7 FL (ref 5–10.5)
RBC # BLD: 5.03 M/UL (ref 4.2–5.9)
WBC # BLD: 8.7 K/UL (ref 4–11)

## 2021-03-17 PROCEDURE — 99214 OFFICE O/P EST MOD 30 MIN: CPT | Performed by: NURSE PRACTITIONER

## 2021-03-17 RX ORDER — TIZANIDINE 2 MG/1
2 TABLET ORAL EVERY 8 HOURS PRN
Qty: 10 TABLET | Refills: 0 | Status: SHIPPED | OUTPATIENT
Start: 2021-03-17 | End: 2021-03-25 | Stop reason: SDUPTHER

## 2021-03-17 SDOH — ECONOMIC STABILITY: FOOD INSECURITY: WITHIN THE PAST 12 MONTHS, THE FOOD YOU BOUGHT JUST DIDN'T LAST AND YOU DIDN'T HAVE MONEY TO GET MORE.: NEVER TRUE

## 2021-03-17 SDOH — ECONOMIC STABILITY: FOOD INSECURITY: WITHIN THE PAST 12 MONTHS, YOU WORRIED THAT YOUR FOOD WOULD RUN OUT BEFORE YOU GOT MONEY TO BUY MORE.: NEVER TRUE

## 2021-03-17 SDOH — ECONOMIC STABILITY: TRANSPORTATION INSECURITY
IN THE PAST 12 MONTHS, HAS THE LACK OF TRANSPORTATION KEPT YOU FROM MEDICAL APPOINTMENTS OR FROM GETTING MEDICATIONS?: NO

## 2021-03-17 ASSESSMENT — ENCOUNTER SYMPTOMS
COUGH: 0
WHEEZING: 0
VOMITING: 0
NAUSEA: 0
ABDOMINAL PAIN: 0
SHORTNESS OF BREATH: 0
DIARRHEA: 0
CONSTIPATION: 0

## 2021-03-17 ASSESSMENT — PATIENT HEALTH QUESTIONNAIRE - PHQ9
1. LITTLE INTEREST OR PLEASURE IN DOING THINGS: 0
SUM OF ALL RESPONSES TO PHQ QUESTIONS 1-9: 0
SUM OF ALL RESPONSES TO PHQ9 QUESTIONS 1 & 2: 0

## 2021-03-17 NOTE — PATIENT INSTRUCTIONS
Labs today    mucscle relaxant as needed     EMG - schedule for next week. Patient Education        tizanidine  Pronunciation:  roman ROBERTA evelyn oneal  Brand:  Zanaflex  What is the most important information I should know about tizanidine? You should not take tizanidine if you are also taking fluvoxamine (Luvox) or ciprofloxacin (Cipro). Do not use tizanidine at a time when you need muscle tone for safe balance and movement during certain activities. What is tizanidine? Tizanidine is a short-acting muscle relaxer. It works by blocking nerve impulses (pain sensations) that are sent to your brain. Tizanidine is used to treat spasticity by temporarily relaxing muscle tone. Tizanidine may also be used for purposes not listed in this medication guide. What should I discuss with my healthcare provider before taking tizanidine? You should not use tizanidine if you are allergic to it, or if:  · you also take the antidepressant fluvoxamine (Luvox); or  · you also take the antibiotic ciprofloxacin (Cipro). To make sure tizanidine is safe for you, tell your doctor if you have:  · liver disease;  · kidney disease; or  · low blood pressure. It is not known whether this medicine will harm an unborn baby. Tell your doctor if you are pregnant or plan to become pregnant. It is not known whether tizanidine passes into breast milk or if it could harm a nursing baby. Tell your doctor if you are breast-feeding a baby. How should I take tizanidine? Follow all directions on your prescription label. Your doctor may occasionally change your dose to make sure you get the best results. Do not use this medicine in larger or smaller amounts or for longer than recommended. In most cases you may take tizanidine up to 3 times in one day if needed. Allow 6 to 8 hours to pass between doses. You may take tizanidine with or without food, but take it the same way each time.  Switching between taking tizanidine with food and taking it without food can make the medicine less effective or cause increased side effects. Switching between tizanidine tablets and capsules can also cause changes in side effects or how well the medicine works. · Taking the tablets with food can increase your blood levels of tizanidine. · Taking the capsules with food can decrease your blood levels of tizanidine. Follow your doctor's instructions carefully. After making any changes in how you take tizanidine, contact your doctor if you notice any change in side effects or in how well the medicine works. Tizanidine is a short-acting medication, and its effects will be most noticeable between 1 and 3 hours after you take it. You should take tizanidine only for daily activities that require relief from muscle spasms. Do not take more than three doses (36 mg) in a 24-hour period. Too much of this medicine can damage your liver. You will need frequent blood tests to check your liver function. If you stop using tizanidine suddenly after long-term use, you may have withdrawal symptoms such as dizziness, fast heartbeats, tremors, and anxiety. Ask your doctor how to safely stop using this medicine. Store at room temperature away from moisture and heat. What happens if I miss a dose? Take the missed dose as soon as you remember. Skip the missed dose if it is almost time for your next scheduled dose. Do not take extra medicine to make up the missed dose. What happens if I overdose? Seek emergency medical attention or call the Poison Help line at 1-338.291.1364. Overdose symptoms may include weakness, drowsiness, confusion, slow heart rate, shallow breathing, feeling light-headed, or fainting. What should I avoid while taking tizanidine? Do not use tizanidine at a time when you need muscle tone for safe balance and movement during certain activities. In some situations, it may be dangerous for you to have reduced muscle tone.   Drinking alcohol with this medicine can cause side effects. This medicine may impair your thinking or reactions. Be careful if you drive or do anything that requires you to be alert. Avoid getting up too fast from a sitting or lying position, or you may feel dizzy. Get up slowly and steady yourself to prevent a fall. What are the possible side effects of tizanidine? Get emergency medical help if you have signs of an allergic reaction: hives; difficult breathing; swelling of your face, lips, tongue, or throat. Call your doctor at once if you have:  · a light-headed feeling, like you might pass out;  · weak or shallow breathing;  · confusion, hallucinations; or  · pain or burning when you urinate. Common side effects may include:  · drowsiness, dizziness, weakness;  · feeling nervous;  · blurred vision;  · flu-like symptoms;  · dry mouth, trouble speaking;  · abnormal liver function tests;  · runny nose, sore throat;  · urination problems;  · vomiting, constipation; or  · uncontrolled muscle movements. This is not a complete list of side effects and others may occur. Call your doctor for medical advice about side effects. You may report side effects to FDA at 9-978-FDA-2611. What other drugs will affect tizanidine? Taking tizanidine with other drugs that make you sleepy or slow your breathing can cause dangerous side effects or death. Ask your doctor before taking a sleeping pill, narcotic pain medicine, prescription cough medicine, a muscle relaxer, or medicine for anxiety, depression, or seizures. Tell your doctor about all your current medicines and any you start or stop using, especially:  · acyclovir;  · ticlopidine;  · zileuton;  · birth control pills;  · an antibiotic --ciprofloxacin, gemifloxacin, levofloxacin, moxifloxacin, or ofloxacin;  · blood pressure medicine --clonidine, guanfacine, methyldopa;  · heart rhythm medicine --amiodarone, mexiletine, propafenone, verapamil; or  · stomach acid medicine --cimetidine, famotidine.   This list is not complete. Other drugs may interact with tizanidine, including prescription and over-the-counter medicines, vitamins, and herbal products. Not all possible interactions are listed in this medication guide. Where can I get more information? Your pharmacist can provide more information about tizanidine. Remember, keep this and all other medicines out of the reach of children, never share your medicines with others, and use this medication only for the indication prescribed. Every effort has been made to ensure that the information provided by [address] is accurate, up-to-date, and complete, but no guarantee is made to that effect. Drug information contained herein may be time sensitive. [facility] information has been compiled for use by healthcare practitioners and consumers in the [facility] and therefore [facility] does not warrant that uses outside of the [facility] are appropriate, unless specifically indicated otherwise. [facility]'s drug information does not endorse drugs, diagnose patients or recommend therapy. [facility] drug information is an informational resource designed to assist licensed healthcare practitioners in caring for their patients and/or to serve consumers viewing this service as a supplement to, and not a substitute for, the expertise, skill, knowledge and judgment of healthcare practitioners. The absence of a warning for a given drug or drug combination in no way should be construed to indicate that the drug or drug combination is safe, effective or appropriate for any given patient. [facility] does not assume any responsibility for any aspect of healthcare administered with the aid of information [facility] provides. The information contained herein is not intended to cover all possible uses, directions, precautions, warnings, drug interactions, allergic reactions, or adverse effects.  If you have questions about the drugs you are taking, check with your doctor, nurse or pharmacist. Copyright 8289-5978 [facility]. Version: 3.01. Revision date: 2/27/2017. Care instructions adapted under license by [facility] ([facility]). If you have questions about a medical condition or this instruction, always ask your healthcare professional. [address] any warranty or liability for your use of this information.
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
## 2021-03-18 DIAGNOSIS — E53.8 FOLIC ACID DEFICIENCY: Primary | ICD-10-CM

## 2021-03-18 LAB
ANION GAP SERPL CALCULATED.3IONS-SCNC: 12 MMOL/L (ref 3–16)
BUN BLDV-MCNC: 13 MG/DL (ref 7–20)
CALCIUM SERPL-MCNC: 9.3 MG/DL (ref 8.3–10.6)
CHLORIDE BLD-SCNC: 103 MMOL/L (ref 99–110)
CO2: 24 MMOL/L (ref 21–32)
CREAT SERPL-MCNC: 0.9 MG/DL (ref 0.9–1.3)
FOLATE: 4.31 NG/ML (ref 4.78–24.2)
GFR AFRICAN AMERICAN: >60
GFR NON-AFRICAN AMERICAN: >60
GLUCOSE BLD-MCNC: 93 MG/DL (ref 70–99)
POTASSIUM SERPL-SCNC: 4.3 MMOL/L (ref 3.5–5.1)
SODIUM BLD-SCNC: 139 MMOL/L (ref 136–145)
TSH REFLEX FT4: 1.49 UIU/ML (ref 0.27–4.2)
VITAMIN B-12: 364 PG/ML (ref 211–911)

## 2021-03-18 RX ORDER — FOLIC ACID 1 MG/1
1 TABLET ORAL DAILY
Qty: 30 TABLET | Refills: 0 | Status: SHIPPED | OUTPATIENT
Start: 2021-03-18 | End: 2022-03-18

## 2021-03-25 ENCOUNTER — OFFICE VISIT (OUTPATIENT)
Dept: INTERNAL MEDICINE CLINIC | Age: 56
End: 2021-03-25
Payer: COMMERCIAL

## 2021-03-25 VITALS
SYSTOLIC BLOOD PRESSURE: 110 MMHG | DIASTOLIC BLOOD PRESSURE: 60 MMHG | WEIGHT: 240 LBS | BODY MASS INDEX: 38.74 KG/M2 | HEART RATE: 84 BPM | TEMPERATURE: 97.2 F | OXYGEN SATURATION: 97 %

## 2021-03-25 DIAGNOSIS — M54.12 CERVICAL RADICULOPATHY: Primary | ICD-10-CM

## 2021-03-25 PROCEDURE — 99213 OFFICE O/P EST LOW 20 MIN: CPT | Performed by: NURSE PRACTITIONER

## 2021-03-25 RX ORDER — TIZANIDINE 2 MG/1
2 TABLET ORAL EVERY 8 HOURS PRN
Qty: 20 TABLET | Refills: 0 | Status: SHIPPED | OUTPATIENT
Start: 2021-03-25 | End: 2021-04-02

## 2021-03-25 RX ORDER — METHYLPREDNISOLONE 4 MG/1
TABLET ORAL
Qty: 1 KIT | Refills: 0 | Status: SHIPPED | OUTPATIENT
Start: 2021-03-25 | End: 2021-03-31
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
## 2021-03-26 ENCOUNTER — HOSPITAL ENCOUNTER (OUTPATIENT)
Age: 56
Discharge: HOME | End: 2021-03-26

## 2021-03-26 ENCOUNTER — HOSPITAL ENCOUNTER (OUTPATIENT)
Dept: GENERAL RADIOLOGY | Age: 56
Discharge: HOME | End: 2021-03-26

## 2021-03-26 DIAGNOSIS — M54.12 CERVICAL RADICULOPATHY: ICD-10-CM

## 2021-03-26 PROCEDURE — 72040 X-RAY EXAM NECK SPINE 2-3 VW: CPT

## 2021-04-02 DIAGNOSIS — M54.12 CERVICAL RADICULOPATHY: ICD-10-CM

## 2021-04-02 RX ORDER — TIZANIDINE 2 MG/1
TABLET ORAL
Qty: 20 TABLET | Refills: 0 | Status: SHIPPED | OUTPATIENT
Start: 2021-04-02 | End: 2021-04-05

## 2021-04-05 DIAGNOSIS — M54.12 CERVICAL RADICULOPATHY: ICD-10-CM

## 2021-04-05 RX ORDER — TIZANIDINE 2 MG/1
TABLET ORAL
Qty: 20 TABLET | Refills: 0 | Status: SHIPPED | OUTPATIENT
Start: 2021-04-05 | End: 2021-04-23

## 2021-04-23 DIAGNOSIS — M54.12 CERVICAL RADICULOPATHY: ICD-10-CM

## 2021-04-23 RX ORDER — TIZANIDINE 2 MG/1
TABLET ORAL
Qty: 20 TABLET | Refills: 0 | Status: SHIPPED | OUTPATIENT
Start: 2021-04-23 | End: 2022-03-18

## 2021-06-08 ENCOUNTER — HOSPITAL ENCOUNTER (EMERGENCY)
Age: 56
Discharge: HOME | End: 2021-06-08
Payer: COMMERCIAL

## 2021-06-08 ENCOUNTER — APPOINTMENT (OUTPATIENT)
Dept: CT IMAGING | Age: 56
End: 2021-06-08
Payer: COMMERCIAL

## 2021-06-08 VITALS
TEMPERATURE: 97.1 F | SYSTOLIC BLOOD PRESSURE: 193 MMHG | HEIGHT: 66 IN | BODY MASS INDEX: 36.96 KG/M2 | DIASTOLIC BLOOD PRESSURE: 97 MMHG | RESPIRATION RATE: 18 BRPM | OXYGEN SATURATION: 97 % | HEART RATE: 99 BPM | WEIGHT: 230 LBS

## 2021-06-08 DIAGNOSIS — S02.5XXA CLOSED FRACTURE OF TOOTH, INITIAL ENCOUNTER: ICD-10-CM

## 2021-06-08 DIAGNOSIS — S09.90XA CLOSED HEAD INJURY, INITIAL ENCOUNTER: ICD-10-CM

## 2021-06-08 DIAGNOSIS — S01.81XA LACERATION OF FOREHEAD, INITIAL ENCOUNTER: Primary | ICD-10-CM

## 2021-06-08 PROCEDURE — 70450 CT HEAD/BRAIN W/O DYE: CPT

## 2021-06-08 PROCEDURE — 90471 IMMUNIZATION ADMIN: CPT | Performed by: PHYSICIAN ASSISTANT

## 2021-06-08 PROCEDURE — 90715 TDAP VACCINE 7 YRS/> IM: CPT | Performed by: PHYSICIAN ASSISTANT

## 2021-06-08 PROCEDURE — 6360000002 HC RX W HCPCS: Performed by: PHYSICIAN ASSISTANT

## 2021-06-08 PROCEDURE — 12053 INTMD RPR FACE/MM 5.1-7.5 CM: CPT

## 2021-06-08 PROCEDURE — 99283 EMERGENCY DEPT VISIT LOW MDM: CPT

## 2021-06-08 PROCEDURE — 6370000000 HC RX 637 (ALT 250 FOR IP): Performed by: PHYSICIAN ASSISTANT

## 2021-06-08 RX ORDER — CEPHALEXIN 500 MG/1
500 CAPSULE ORAL 2 TIMES DAILY
Qty: 14 CAPSULE | Refills: 0 | Status: SHIPPED | OUTPATIENT
Start: 2021-06-08 | End: 2021-06-15

## 2021-06-08 RX ORDER — ACETAMINOPHEN 325 MG/1
325 TABLET ORAL ONCE
Status: COMPLETED | OUTPATIENT
Start: 2021-06-08 | End: 2021-06-08

## 2021-06-08 RX ORDER — LIDOCAINE HYDROCHLORIDE AND EPINEPHRINE 10; 10 MG/ML; UG/ML
20 INJECTION, SOLUTION INFILTRATION; PERINEURAL ONCE
Status: DISCONTINUED | OUTPATIENT
Start: 2021-06-08 | End: 2021-06-08 | Stop reason: HOSPADM

## 2021-06-08 RX ORDER — IBUPROFEN 600 MG/1
600 TABLET ORAL EVERY 6 HOURS PRN
Qty: 30 TABLET | Refills: 0 | Status: SHIPPED | OUTPATIENT
Start: 2021-06-08 | End: 2022-03-18

## 2021-06-08 RX ORDER — BACITRACIN, NEOMYCIN, POLYMYXIN B 400; 3.5; 5 [USP'U]/G; MG/G; [USP'U]/G
OINTMENT TOPICAL
Status: DISCONTINUED
Start: 2021-06-08 | End: 2021-06-08 | Stop reason: HOSPADM

## 2021-06-08 RX ADMIN — TETANUS TOXOID, REDUCED DIPHTHERIA TOXOID AND ACELLULAR PERTUSSIS VACCINE, ADSORBED 0.5 ML: 5; 2.5; 8; 8; 2.5 SUSPENSION INTRAMUSCULAR at 15:44

## 2021-06-08 RX ADMIN — ACETAMINOPHEN 325 MG: 325 TABLET ORAL at 15:44

## 2021-06-08 ASSESSMENT — PAIN SCALES - GENERAL: PAINLEVEL_OUTOF10: 6

## 2021-06-08 ASSESSMENT — ENCOUNTER SYMPTOMS
TROUBLE SWALLOWING: 0
VOICE CHANGE: 0
NAUSEA: 0
WHEEZING: 0
VOMITING: 0
SHORTNESS OF BREATH: 0
BACK PAIN: 0
EYE DISCHARGE: 0
COUGH: 0
COLOR CHANGE: 0
SORE THROAT: 0
EYE PAIN: 0
STRIDOR: 0
EYE REDNESS: 0
EYE ITCHING: 0
PHOTOPHOBIA: 0
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
## 2021-06-08 NOTE — ED NOTES
Wound care done, medicated, pt afebrile. Pt discharged home, verbalized discharge instructions. Ambulated independently to exit without difficulty.          Nito Gonzalez RN  06/08/21 5457

## 2022-03-01 ENCOUNTER — OFFICE VISIT (OUTPATIENT)
Dept: INTERNAL MEDICINE CLINIC | Age: 57
End: 2022-03-01
Payer: COMMERCIAL

## 2022-03-01 VITALS
HEART RATE: 91 BPM | WEIGHT: 237 LBS | DIASTOLIC BLOOD PRESSURE: 84 MMHG | SYSTOLIC BLOOD PRESSURE: 116 MMHG | OXYGEN SATURATION: 99 % | BODY MASS INDEX: 38.25 KG/M2

## 2022-03-01 DIAGNOSIS — M25.512 ACUTE PAIN OF LEFT SHOULDER: ICD-10-CM

## 2022-03-01 DIAGNOSIS — M54.2 NECK PAIN: Primary | ICD-10-CM

## 2022-03-01 PROCEDURE — 99213 OFFICE O/P EST LOW 20 MIN: CPT | Performed by: NURSE PRACTITIONER

## 2022-03-01 RX ORDER — METHYLPREDNISOLONE 4 MG/1
TABLET ORAL
Qty: 1 KIT | Refills: 0 | Status: SHIPPED | OUTPATIENT
Start: 2022-03-01 | End: 2022-03-07

## 2022-03-01 ASSESSMENT — ENCOUNTER SYMPTOMS
SHORTNESS OF BREATH: 0
VOMITING: 0
DIARRHEA: 0
COUGH: 0
NAUSEA: 0
CONSTIPATION: 0
WHEEZING: 0
ABDOMINAL PAIN: 0

## 2022-03-01 NOTE — PATIENT INSTRUCTIONS
Patient Education        methylprednisolone (oral)  Pronunciation:  METH il pred NIS oh lone  Brand:  Medrol, Medrol Dosepak, MethylPREDNISolone Dose Pack  What is the most important information I should know about methylprednisolone? You should not use this medicine if you have a fungal infection anywhere in your body. What is methylprednisolone? Methylprednisolone is a steroid that prevents the release of substances in the body that cause inflammation. Methylprednisolone is used to treat many different inflammatory conditions such as arthritis, lupus, psoriasis, ulcerative colitis, allergic disorders, gland (endocrine) disorders, and conditions that affect the skin, eyes, lungs, stomach, nervous system, or blood cells. Methylprednisolone may also be used for purposes not listed in this medication guide. What should I discuss with my healthcare provider before taking methylprednisolone? You should not use methylprednisolone if you are allergic to it, or if you have:  · a fungal infection anywhere in your body. Methylprednisolone can weaken your immune system, making it easier for you to get an infection. Steroids can also worsen an infection you already have, or reactivate an infection you recently had. Tell your doctor about any illness or infection you have had within the past several weeks. To make sure methylprednisolone is safe for you, tell your doctor if you have ever had:  · a thyroid disorder;  · herpes infection of the eyes;  · stomach ulcers, ulcerative colitis, or diverticulitis;  · depression, mental illness, or psychosis;  · liver disease (especially cirrhosis);  · high blood pressure;  · osteoporosis;  · a muscle disorder such as myasthenia gravis; or  · multiple sclerosis. Also tell your doctor if you have diabetes. Steroid medicines may increase the glucose (sugar) levels in your blood or urine. You may also need to adjust the dose of your diabetes medications.   It is not known whether this medicine will harm an unborn baby. Tell your doctor if you are pregnant or plan to become pregnant. It is not known whether methylprednisolone passes into breast milk or if it could affect the nursing baby. Tell your doctor if you are breast-feeding. How should I take methylprednisolone? Follow all directions on your prescription label. Your doctor may occasionally change your dose. Do not use this medicine in larger or smaller amounts or for longer than recommended. Methylprednisolone is sometimes taken every other day. Follow your doctor's dosing instructions very carefully. Your dose needs may change if you have unusual stress such as a serious illness, fever or infection, or if you have surgery or a medical emergency. Tell your doctor about any such situation that affects you. This medicine can cause unusual results with certain medical tests. Tell any doctor who treats you that you are using methylprednisolone. You should not stop using methylprednisolone suddenly. Follow your doctor's instructions about tapering your dose. Wear a medical alert tag or carry an ID card stating that you take methylprednisolone. Any medical care provider who treats you should know that you take steroid medication. If you need surgery, tell the surgeon ahead of time that you are using methylprednisolone. You may need to stop using the medicine for a short time. Store at room temperature away from moisture and heat. What happens if I miss a dose? Call your doctor for instructions if you miss a dose of methylprednisolone. What happens if I overdose? Seek emergency medical attention or call the Poison Help line at 1-951.301.5292. An overdose of methylprednisolone is not expected to produce life threatening symptoms.  However, long term use of high steroid doses can lead to symptoms such as thinning skin, easy bruising, changes in the shape or location of body fat (especially in your face, neck, back, and waist), increased acne or facial hair, menstrual problems, impotence, or loss of interest in sex. What should I avoid while taking methylprednisolone? Avoid being near people who are sick or have infections. Call your doctor for preventive treatment if you are exposed to chicken pox or measles. These conditions can be serious or even fatal in people who are using steroid medication. Do not receive a \"live\" vaccine while using methylprednisolone. The vaccine may not work as well during this time, and may not fully protect you from disease. Live vaccines include measles, mumps, rubella (MMR), polio, rotavirus, typhoid, yellow fever, varicella (chickenpox), zoster (shingles), and nasal flu (influenza) vaccine. What are the possible side effects of methylprednisolone? Get emergency medical help if you have signs of an allergic reaction: hives; difficult breathing; swelling of your face, lips, tongue, or throat. Call your doctor at once if you have:  · shortness of breath (even with mild exertion), swelling, rapid weight gain;  · bruising, thinning skin, or any wound that will not heal;  · blurred vision, tunnel vision, eye pain, or seeing halos around lights;  · severe depression, changes in personality, unusual thoughts or behavior;  · new or unusual pain in an arm or leg or in your back;  · bloody or tarry stools, coughing up blood or vomit that looks like coffee grounds;  · seizure (convulsions); or  · low potassium --leg cramps, constipation, irregular heartbeats, fluttering in your chest, increased thirst or urination, numbness or tingling. Steroids can affect growth in children. Tell your doctor if your child is not growing at a normal rate while using this medicine. Common side effects may include:  · fluid retention (swelling in your hands or ankles);  · dizziness, spinning sensation;  · changes in your menstrual periods;  · headache;  · mild muscle pain or weakness; or  · stomach discomfort, bloating.   This is not a complete list of side effects and others may occur. Call your doctor for medical advice about side effects. You may report side effects to FDA at 1-492-REF-9966. What other drugs will affect methylprednisolone? Other drugs may interact with methylprednisolone, including prescription and over-the-counter medicines, vitamins, and herbal products. Tell each of your health care providers about all medicines you use now and any medicine you start or stop using. Where can I get more information? Your pharmacist can provide more information about methylprednisolone. Remember, keep this and all other medicines out of the reach of children, never share your medicines with others, and use this medication only for the indication prescribed. Every effort has been made to ensure that the information provided by [address] is accurate, up-to-date, and complete, but no guarantee is made to that effect. Drug information contained herein may be time sensitive. [employer] information has been compiled for use by healthcare practitioners and consumers in the [place] and therefore [employer] does not warrant that uses outside of the [place] are appropriate, unless specifically indicated otherwise. [facility] drug information does not endorse drugs, diagnose patients or recommend therapy. [facility] drug information is an informational resource designed to assist licensed healthcare practitioners in caring for their patients and/or to serve consumers viewing this service as a supplement to, and not a substitute for, the expertise, skill, knowledge and judgment of healthcare practitioners. The absence of a warning for a given drug or drug combination in no way should be construed to indicate that the drug or drug combination is safe, effective or appropriate for any given patient.  [facility] does not assume any responsibility for any aspect of healthcare administered with the aid of information [facility] provides. The information contained herein is not intended to cover all possible uses, directions, precautions, warnings, drug interactions, allergic reactions, or adverse effects. If you have questions about the drugs you are taking, check with your doctor, nurse or pharmacist.  Copyright 8269-4878 [address]. Version: 9.01. Revision date: 8/30/2017. Care instructions adapted under license by [facility] ([facility]). If you have questions about a medical condition or this instruction, always ask your healthcare professional. [place] [address] any warranty or liability for your use of this information.
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
## 2022-03-18 ENCOUNTER — OFFICE VISIT (OUTPATIENT)
Dept: INTERNAL MEDICINE CLINIC | Age: 57
End: 2022-03-18
Payer: COMMERCIAL

## 2022-03-18 VITALS
SYSTOLIC BLOOD PRESSURE: 120 MMHG | HEIGHT: 66 IN | DIASTOLIC BLOOD PRESSURE: 70 MMHG | WEIGHT: 239 LBS | HEART RATE: 83 BPM | OXYGEN SATURATION: 95 % | BODY MASS INDEX: 38.41 KG/M2

## 2022-03-18 DIAGNOSIS — Z13.1 DIABETES MELLITUS SCREENING: ICD-10-CM

## 2022-03-18 DIAGNOSIS — E53.8 FOLIC ACID DEFICIENCY: ICD-10-CM

## 2022-03-18 DIAGNOSIS — E78.2 MIXED HYPERLIPIDEMIA: ICD-10-CM

## 2022-03-18 DIAGNOSIS — Z12.11 COLON CANCER SCREENING: ICD-10-CM

## 2022-03-18 DIAGNOSIS — Z00.00 ANNUAL PHYSICAL EXAM: Primary | ICD-10-CM

## 2022-03-18 DIAGNOSIS — Z00.00 ANNUAL PHYSICAL EXAM: ICD-10-CM

## 2022-03-18 LAB
A/G RATIO: 1.5 (ref 1.1–2.2)
ALBUMIN SERPL-MCNC: 4.4 G/DL (ref 3.4–5)
ALP BLD-CCNC: 79 U/L (ref 40–129)
ALT SERPL-CCNC: 14 U/L (ref 10–40)
ANION GAP SERPL CALCULATED.3IONS-SCNC: 16 MMOL/L (ref 3–16)
AST SERPL-CCNC: 22 U/L (ref 15–37)
BILIRUB SERPL-MCNC: <0.2 MG/DL (ref 0–1)
BUN BLDV-MCNC: 12 MG/DL (ref 7–20)
CALCIUM SERPL-MCNC: 9.8 MG/DL (ref 8.3–10.6)
CHLORIDE BLD-SCNC: 103 MMOL/L (ref 99–110)
CHOLESTEROL, TOTAL: 208 MG/DL (ref 0–199)
CO2: 22 MMOL/L (ref 21–32)
CREAT SERPL-MCNC: 1 MG/DL (ref 0.9–1.3)
GFR AFRICAN AMERICAN: >60
GFR NON-AFRICAN AMERICAN: >60
GLUCOSE BLD-MCNC: 86 MG/DL (ref 70–99)
HDLC SERPL-MCNC: 37 MG/DL (ref 40–60)
LDL CHOLESTEROL CALCULATED: 149 MG/DL
POTASSIUM SERPL-SCNC: 4.6 MMOL/L (ref 3.5–5.1)
SODIUM BLD-SCNC: 141 MMOL/L (ref 136–145)
TOTAL PROTEIN: 7.4 G/DL (ref 6.4–8.2)
TRIGL SERPL-MCNC: 112 MG/DL (ref 0–150)
VLDLC SERPL CALC-MCNC: 22 MG/DL

## 2022-03-18 PROCEDURE — 99396 PREV VISIT EST AGE 40-64: CPT | Performed by: NURSE PRACTITIONER

## 2022-03-18 ASSESSMENT — PATIENT HEALTH QUESTIONNAIRE - PHQ9
SUM OF ALL RESPONSES TO PHQ QUESTIONS 1-9: 0
SUM OF ALL RESPONSES TO PHQ9 QUESTIONS 1 & 2: 0
1. LITTLE INTEREST OR PLEASURE IN DOING THINGS: 0
SUM OF ALL RESPONSES TO PHQ QUESTIONS 1-9: 0
2. FEELING DOWN, DEPRESSED OR HOPELESS: 0

## 2022-03-19 LAB
ESTIMATED AVERAGE GLUCOSE: 125.5 MG/DL
HBA1C MFR BLD: 6 %

## 2022-03-28 ASSESSMENT — ENCOUNTER SYMPTOMS
GASTROINTESTINAL NEGATIVE: 1
RESPIRATORY NEGATIVE: 1

## 2022-03-28 NOTE — PROGRESS NOTES
SUBJECTIVE:    Patient ID: Annette Nair is a 64 y.o. male. CC: Annual physical, hyperlipidemia, folate deficiency    HPI: The patient presents to the office today for any physical examination and follow-up of chronic medical conditions. Patient has no specific acute complaint today. Patient has a history of hyperlipidemia. He was previously on Lipitor but is no longer taking this medication. Patient has history of folic acid deficiency. He was previously taking folic acid but is no longer taking this medication. Discussed the importance of colon cancer screening. No past medical history on file. No current outpatient medications on file. No current facility-administered medications for this visit. Review of Systems   Constitutional: Negative. Respiratory: Negative. Cardiovascular: Negative. Gastrointestinal: Negative. Genitourinary: Negative. Musculoskeletal: Negative. Neurological: Negative. Psychiatric/Behavioral: Negative. All other systems reviewed and are negative. OBJECTIVE:  Physical Exam  Vitals reviewed. Constitutional:       General: He is not in acute distress. Appearance: He is well-developed. He is not diaphoretic. HENT:      Head: Normocephalic and atraumatic. Eyes:      General: No scleral icterus. Conjunctiva/sclera: Conjunctivae normal.   Neck:      Vascular: No JVD. Cardiovascular:      Rate and Rhythm: Normal rate and regular rhythm. Pulmonary:      Effort: Pulmonary effort is normal. No respiratory distress. Breath sounds: Normal breath sounds. No wheezing or rales. Abdominal:      General: There is no distension. Palpations: Abdomen is soft. Tenderness: There is no abdominal tenderness. There is no guarding or rebound. Musculoskeletal:         General: Normal range of motion. Cervical back: Neck supple. Skin:     General: Skin is warm and dry.    Neurological:      Mental Status: He is alert and oriented to person, place, and time. Psychiatric:         Behavior: Behavior normal.         Thought Content: Thought content normal.        /70   Pulse 83   Ht 5' 6\" (1.676 m)   Wt 239 lb (108.4 kg)   SpO2 95%   BMI 38.58 kg/m²      PHQ Scores 3/18/2022 3/17/2021 9/8/2020   PHQ2 Score 0 0 0   PHQ9 Score 0 0 0     Interpretation of Total Score Depression Severity: 1-4 = Minimal depression, 5-9 = Mild depression, 10-14 = Moderate depression, 15-19 = Moderately severe depression, 20-27 =Severe depression        ASSESSMENT/PLAN:  Valentín Canchola was seen today for annual exam.    Diagnoses and all orders for this visit:    Annual physical exam  -     Comprehensive Metabolic Panel; Future  -     LIPID PANEL; Future  -     Hemoglobin A1C; Future  -     Folate; Future    Folic acid deficiency  -     Folate; Future    Mixed hyperlipidemia  -     Comprehensive Metabolic Panel; Future  -     LIPID PANEL; Future  -     Hemoglobin A1C; Future    Diabetes mellitus screening  -     Comprehensive Metabolic Panel; Future  -     LIPID PANEL;  Future  -     Hemoglobin A1C; Future    Colon cancer screening  -     AFL - Dagoberto Bui MD, Gastroenterology (ERCP & EUS), [facility]        Giovanni Cheung APRN - CNP

## 2022-09-27 ENCOUNTER — TELEPHONE (OUTPATIENT)
Dept: INTERNAL MEDICINE CLINIC | Age: 57
End: 2022-09-27

## 2022-09-27 NOTE — TELEPHONE ENCOUNTER
----- Message from Sophia Pearson sent at 9/27/2022 10:42 AM EDT -----  Subject: Hospital Follow Up    QUESTIONS  What hospital was the Patient Discharged from? [address] [place] [address] system  Date of Discharge? 2022-09-28  Discharge Location? Home  Reason for hospitalization as patient stated? STL elevated MI AND Afib RVR  What question does the patient have, if applicable?   ---------------------------------------------------------------------------  --------------  CALL BACK INFO  What is the best way for the office to contact you? Do not leave any   message, patient will call back for answer  Preferred Call Back Phone Number? [phone]  ---------------------------------------------------------------------------  --------------  SCRIPT ANSWERS  Relationship to Patient? Third Party  Third Party Type? Hospital?   Representative Name?  Mer

## 2022-09-27 NOTE — TELEPHONE ENCOUNTER
Patient is currently still in the hospital. Spouse report patient will probably be discharged tomorrow 9/28 - she wanted to schedule the HFU now - scheduled  for Monday 10/3/22. Spouse advised to call the office on Friday should the appt need to be changed or if patient has not been discharged.

## 2022-09-29 PROBLEM — I25.83 CORONARY ARTERY DISEASE DUE TO LIPID RICH PLAQUE: Status: ACTIVE | Noted: 2022-09-29

## 2022-09-29 PROBLEM — I25.10 CORONARY ARTERY DISEASE DUE TO LIPID RICH PLAQUE: Status: ACTIVE | Noted: 2022-09-29

## 2022-09-29 PROBLEM — E78.2 MIXED HYPERLIPIDEMIA: Status: ACTIVE | Noted: 2022-09-29

## 2022-09-29 PROBLEM — I46.9 CARDIAC ARREST (HCC): Status: ACTIVE | Noted: 2022-09-29

## 2022-09-29 PROBLEM — I10 HYPERTENSION: Status: ACTIVE | Noted: 2022-09-29

## 2022-09-29 NOTE — PROGRESS NOTES
[facility]   Cardiac Evaluation      Patient: Radha Block  YOB: 1965         Chief Complaint   Patient presents with    Coronary Artery Disease    Hyperlipidemia    Hypertension    New Patient          Referring provider: SUGAR Butler CNP    History of Present Illness:   Radha Block is a 64 y.o. male presenting as a new patient for recent MI with arrest, stent to prox LAD 9/25/22. No known family hx of heart disease.  and lifts heavy batteries. Smokes marijuana     Mr Walter Vargas was out of town during his MI and had a stent placed 9/25/22. Records have been requested from [address] [place] [address] in [place]. Today he is here with his wife. He reports he has soreness in his ribs from CPR. When his BP going into the 90s he feels tired. Sunday he woke up and was getting ready to get on his motorcycle to leave. He felt terrible heartburn and went to lay down. His friend checked on him and he decided to try to get back on the bike and he mentioned his throat was \"on fire\". His friend called his wife to get her opinion. Chai was called and he does not remember anything until Monday morning. He had a seizure when the squad came and has [address] now, no hx of seizures prior. He coded and had 2 shock with CPR. Wife reports his EF is 40%, we are waiting for records. OF NOTE RECORDS FROM [facility] NOT AVAILABLE TO MYSELF AT THE TIME OF THIS VISIT. [facility] REC[facility]DS [facility]PART[facility] NOT RESPONDING TO OUR REQUESTS FOR RECORDS. With regard to medication therapy he/she has been compliant with prescribed regimen and has tolerated therapy to date. Past Medical History:   has a past medical history of Hyperlipidemia, Hypertension, and MI (myocardial infarction) ([facility] [address] Surgical History:   has a past surgical history that includes Colonoscopy; Knee arthroscopy (Right); Leg Surgery (Left, 07/29/2016); and Cystoscopy (Right, 10/28/2020).      Current Outpatient Medications   Medication Sig Dispense Refill    nitroGLYCERIN (NITROSTAT) 0.4 MG SL tablet DISSOLVE 1 TABLET UNDER THE TONGUE EVERY 5 MINUTES AS NEEDED FOR CHEST PAIN. DO NOT EXCEED A TOTAL OF 3 DOSES IN 15 MINUTES. ASPIRIN LOW DOSE 81 MG EC tablet TAKE 1 TABLET BY MOUTH ONCE DAILY      atorvastatin (LIPITOR) 40 MG tablet take 1 tablet by mouth at bedtime      lisinopril (PRINIVIL;ZESTRIL) 2.5 MG tablet TAKE 1 TABLET BY MOUTH ONCE DAILY. (hold FOR BLOOD PRESSURE less THAN 100/60)      ticagrelor (BRILINTA) 90 MG TABS tablet Take 90 mg by mouth 2 times daily      furosemide (LASIX) 20 MG tablet Take 1 tablet by mouth daily 90 tablet 1     No current facility-administered medications for this visit.        Allergies:  Codeine, Percocet [oxycodone-acetaminophen], and Vicodin [hydrocodone-acetaminophen]     Social History:  Social History     Socioeconomic History    Marital status:      Spouse name: Not on file    Number of children: Not on file    Years of education: Not on file    Highest education level: Not on file   Occupational History    Not on file   Tobacco Use    Smoking status: Never    Smokeless tobacco: Never   Vaping Use    Vaping Use: Never used   Substance and Sexual Activity    Alcohol use: Yes     Comment: social    Drug use: Not Currently     Types: Marijuana Vandiver Bruno)     Comment: occassional    Sexual activity: Yes   Other Topics Concern    Not on file   Social History Narrative    Not on file     Social Determinants of Health     Financial Resource Strain: Not on file   Food Insecurity: Not on file   Transportation Needs: Not on file   Physical Activity: Not on file   Stress: Not on file   Social Connections: Not on file   Intimate Partner Violence: Not on file   Housing Stability: Not on file       Family History:   Family History   Problem Relation Age of Onset    High Blood Pressure Mother     Other Father         parkinsons     Family history has been reviewed and not pertinent except as noted above. Review of Systems:   Constitutional: there has been no unanticipated weight loss. No change in energy or activity level   Eyes: No visual changes   ENT: No Headaches, hearing loss or vertigo. No mouth sores or sore throat. Cardiovascular: Reviewed in HPI  Respiratory: No cough or wheezing, no sputum production. Gastrointestinal: No abdominal pain, appetite loss, blood in stools. No change in bowel or bladder habits. Genitourinary: No nocturia, dysuria, trouble voiding  Musculoskeletal:  No gait disturbance, weakness or joint complaints. Integumentary: No rash or pruritis. Neurological: No headache, change in muscle strength, numbness or tingling. No change in gait, balance, coordination, mood, affect, memory, mentation, behavior. Psychiatric: No anxiety or depression  Endocrine: No malaise or fever  Hematologic/Lymphatic: No abnormal bruising or bleeding, blood clots or swollen lymph nodes. Allergic/Immunologic: No nasal congestion or hives. Physical Examination:    Vitals:    09/30/22 0922   BP: 114/60   Site: Left Upper Arm   Position: Sitting   Cuff Size: Medium Adult   Pulse: 92   SpO2: 98%   Weight: 238 lb 12.8 oz (108.3 kg)   Height: 5' 6\" (1.676 m)     Body mass index is 38.54 kg/m². Wt Readings from Last 3 Encounters:   09/30/22 238 lb 12.8 oz (108.3 kg)   03/18/22 239 lb (108.4 kg)   03/01/22 237 lb (107.5 kg)      BP Readings from Last 3 Encounters:   09/30/22 114/60   03/18/22 120/70   03/01/22 116/84        Physical Examination:    CONSTITUTIONAL: Well developed, well nourished  EYES: PERRLA. No xanthelasma, sclera non icteric  EARS,NOSE,MOUTH,THROAT:  Mucous membranes moist, normal hearing  NECK: Supple, JVP normal, thyroid not enlarged. Carotids 2+ without bruits  RESPIRATORY: Normal effort, no rales or rhonchi  CARDIOVASCULAR: Normal PMI, regular rate and rhythm, no murmurs, rub or gallop. No edema.  Radial pulses present and equal  CHEST: No scar or masses  ABDOMEN: Normal bowel sounds. No masses or tenderness. No bruit  MUSCULOSKELETAL: No clubbing or cyanosis. Moves all extremities well. Normal gait  SKIN:  Warm and dry. No rashes  NEUROLOGIC: Cranial nerves intact. Alert and oriented  PSYCHIATRIC: Calm affect. Appears to have normal judgement and insight    All testing and labs listed below were personally reviewed by myself. Pending records from [address] [place] [address]  Discs received today       Assessment/Plan  1. Coronary artery disease due to lipid rich plaque    2. Hypertension, unspecified type    3. Mixed hyperlipidemia    4. Cardiac arrest ([address]    5. Claudication [facility])        Coronary artery disease  Angina~ msk pain from CPR  CCS class~ 1  Intervention  Cardiac arrest / STEMI  LHC~ PCI to prox LAD  Current meds~ asa / Brilinta / nitro   Plan~ continue DAPT, echo in 20 days (30 days from MI)    Hypertension  /60 (Site: Left Upper Arm, Position: Sitting, Cuff Size: Medium Adult)   Pulse 92   Ht 5' 6\" (1.676 m)   Wt 238 lb 12.8 oz (108.3 kg)   SpO2 98%   BMI 38.54 kg/m²   Meds~ lisinopril / metoprolol 12.5 bid (hold if bp <100/60 or HR <60)  Plan~ stop metoprolol d/t hypotension, start lasix for swelling     Hyperlipidemia   3/18/22      LDL  149   HDL 37  Meds~ Lipitor   Plan~ repeat lipids     Marijuana use  Reports he will try to quit    Claudication  ABIs 2-3 months     Follow up 2 weeks with NP  3 months with Dr Bonnie Connor This Encounter   Procedures    VL CAROLYN BILATERAL LIMITED 1-2 LEVELS    Echo 2D w doppler w color complete     OF NOTE I AM UNABLE TO MAKE APPROPRIATE DIAGNOSIS AND TREATMENT DUE TO LACK OF MEDICAL RECORDS. Ingris Rivera MD      Thank you for allowing to me to participate in the care of Sigrid Diaz. Scribe's Attestation:    This note was scribed in the presence of Dr. Dashawn Iglesias MD by Eunice Austin RN.     I, Dr. Dashawn Iglesias, personally performed the services described in this documentation, as scribed by the above signed scribe in my presence. It is both accurate and complete to my knowledge. I agree with the details independently gathered by the clinical support staff, while the remaining scribed note accurately describes my personal service to the patient.        f

## 2022-09-30 ENCOUNTER — OFFICE VISIT (OUTPATIENT)
Dept: CARDIOLOGY CLINIC | Age: 57
End: 2022-09-30
Payer: COMMERCIAL

## 2022-09-30 VITALS
SYSTOLIC BLOOD PRESSURE: 114 MMHG | DIASTOLIC BLOOD PRESSURE: 60 MMHG | WEIGHT: 238.8 LBS | BODY MASS INDEX: 38.38 KG/M2 | HEIGHT: 66 IN | OXYGEN SATURATION: 98 % | HEART RATE: 92 BPM

## 2022-09-30 DIAGNOSIS — I25.83 CORONARY ARTERY DISEASE DUE TO LIPID RICH PLAQUE: Primary | ICD-10-CM

## 2022-09-30 DIAGNOSIS — I73.9 CLAUDICATION (HCC): ICD-10-CM

## 2022-09-30 DIAGNOSIS — I25.10 CORONARY ARTERY DISEASE DUE TO LIPID RICH PLAQUE: Primary | ICD-10-CM

## 2022-09-30 DIAGNOSIS — E78.2 MIXED HYPERLIPIDEMIA: ICD-10-CM

## 2022-09-30 DIAGNOSIS — I10 HYPERTENSION, UNSPECIFIED TYPE: ICD-10-CM

## 2022-09-30 DIAGNOSIS — I46.9 CARDIAC ARREST (HCC): ICD-10-CM

## 2022-09-30 PROCEDURE — 99204 OFFICE O/P NEW MOD 45 MIN: CPT | Performed by: INTERNAL MEDICINE

## 2022-09-30 RX ORDER — FUROSEMIDE 20 MG/1
20 TABLET ORAL DAILY
Qty: 90 TABLET | Refills: 1 | Status: SHIPPED | OUTPATIENT
Start: 2022-09-30

## 2022-09-30 RX ORDER — ATORVASTATIN CALCIUM 40 MG/1
TABLET, FILM COATED ORAL
COMMUNITY
Start: 2022-09-28 | End: 2022-10-24 | Stop reason: SDUPTHER

## 2022-09-30 RX ORDER — NITROGLYCERIN 0.4 MG/1
TABLET SUBLINGUAL
COMMUNITY
Start: 2022-09-28

## 2022-09-30 RX ORDER — LISINOPRIL 2.5 MG/1
TABLET ORAL
COMMUNITY
Start: 2022-09-28 | End: 2022-10-24 | Stop reason: SDUPTHER

## 2022-09-30 RX ORDER — LEVETIRACETAM 500 MG/1
TABLET ORAL
COMMUNITY
Start: 2022-09-28 | End: 2022-09-30 | Stop reason: ALTCHOICE

## 2022-09-30 RX ORDER — ASPIRIN 81 MG/1
TABLET, COATED ORAL
COMMUNITY
Start: 2022-09-28 | End: 2022-10-24 | Stop reason: SDUPTHER

## 2022-09-30 NOTE — PROGRESS NOTES
Sigrid Diaz (:  1965) is a 64 y.o. male,{New vs Established:[id number]::\"Established patient\"}, here for evaluation of the following chief complaint(s):  Coronary Artery Disease, Hyperlipidemia, Hypertension, and New Patient         ASSESSMENT/PLAN:  1. Coronary artery disease due to lipid rich plaque  -     Echo 2D w doppler w color complete; Future  2. Hypertension, unspecified type  3. Mixed hyperlipidemia  4. Cardiac arrest ([facility] [address]  -     Echo 2D w doppler w color complete; Future  5. Claudication ([address]  -     VL CAROLYN BILATERAL LIMITED 1-2 LEVELS; Future      Return in about 2 weeks (around 10/14/2022). Subjective   SUBJECTIVE/OBJECTIVE:  Coronary Artery Disease  Risk factors include hyperlipidemia. Hyperlipidemia    Hypertension      Review of Systems       Objective   Physical Exam       {Time Documentation Optional:[id number]}      An electronic signature was used to authenticate this note.     --Dashawn Iglesias MD

## 2022-10-03 ENCOUNTER — OFFICE VISIT (OUTPATIENT)
Dept: INTERNAL MEDICINE CLINIC | Age: 57
End: 2022-10-03
Payer: COMMERCIAL

## 2022-10-03 VITALS
SYSTOLIC BLOOD PRESSURE: 110 MMHG | OXYGEN SATURATION: 96 % | WEIGHT: 235 LBS | HEART RATE: 93 BPM | DIASTOLIC BLOOD PRESSURE: 70 MMHG | HEIGHT: 66 IN | BODY MASS INDEX: 37.77 KG/M2

## 2022-10-03 DIAGNOSIS — Z09 HOSPITAL DISCHARGE FOLLOW-UP: Primary | ICD-10-CM

## 2022-10-03 DIAGNOSIS — E78.2 MIXED HYPERLIPIDEMIA: ICD-10-CM

## 2022-10-03 DIAGNOSIS — I25.10 CORONARY ARTERY DISEASE DUE TO LIPID RICH PLAQUE: ICD-10-CM

## 2022-10-03 DIAGNOSIS — I10 PRIMARY HYPERTENSION: ICD-10-CM

## 2022-10-03 DIAGNOSIS — R07.89 CHEST WALL PAIN: ICD-10-CM

## 2022-10-03 DIAGNOSIS — I46.9 CARDIAC ARREST (HCC): ICD-10-CM

## 2022-10-03 DIAGNOSIS — I25.83 CORONARY ARTERY DISEASE DUE TO LIPID RICH PLAQUE: ICD-10-CM

## 2022-10-03 PROCEDURE — 1111F DSCHRG MED/CURRENT MED MERGE: CPT | Performed by: NURSE PRACTITIONER

## 2022-10-03 PROCEDURE — 99214 OFFICE O/P EST MOD 30 MIN: CPT | Performed by: NURSE PRACTITIONER

## 2022-10-03 SDOH — ECONOMIC STABILITY: FOOD INSECURITY: WITHIN THE PAST 12 MONTHS, THE FOOD YOU BOUGHT JUST DIDN'T LAST AND YOU DIDN'T HAVE MONEY TO GET MORE.: NEVER TRUE

## 2022-10-03 SDOH — ECONOMIC STABILITY: FOOD INSECURITY: WITHIN THE PAST 12 MONTHS, YOU WORRIED THAT YOUR FOOD WOULD RUN OUT BEFORE YOU GOT MONEY TO BUY MORE.: NEVER TRUE

## 2022-10-03 ASSESSMENT — SOCIAL DETERMINANTS OF HEALTH (SDOH): HOW HARD IS IT FOR YOU TO PAY FOR THE VERY BASICS LIKE FOOD, HOUSING, MEDICAL CARE, AND HEATING?: NOT HARD AT ALL

## 2022-10-03 NOTE — PROGRESS NOTES
SUBJECTIVE:    Patient ID: Tara Bangura is a 64 y.o. male. CC: MI with arrest, stent to proximal LAD, seizure, rib pain,     HPI: The patient presents to the office for follow-up of recent medical issues. Patient was out of town on a motorcycle trip with friends when he experienced chest pain. He felt t this was reflux. He later worsened and squad was called. Patient experienced cardiac arrest and was admitted to [address] [place] [address] for cardiac arrest.  He was given CPR and 2 defibrillations. He also experienced an apparent seizure. He underwent cardiac cath and was found to have 100% blockage of the LAD. This was stented and he was placed on Keppra for seizure. Today he is back home. He has seen local cardiology for follow-up. He has concerns about returning to work given his chest pain wall pain from CPR and defibrillation. He has a very physical job which he is currently unable to perform. Past Medical History:   Diagnosis Date    Hyperlipidemia     Hypertension     MI (myocardial infarction) ([facility] [address]         Current Outpatient Medications   Medication Sig Dispense Refill    nitroGLYCERIN (NITROSTAT) 0.4 MG SL tablet DISSOLVE 1 TABLET UNDER THE TONGUE EVERY 5 MINUTES AS NEEDED FOR CHEST PAIN. DO NOT EXCEED A TOTAL OF 3 DOSES IN 15 MINUTES. ASPIRIN LOW DOSE 81 MG EC tablet TAKE 1 TABLET BY MOUTH ONCE DAILY      atorvastatin (LIPITOR) 40 MG tablet take 1 tablet by mouth at bedtime      lisinopril (PRINIVIL;ZESTRIL) 2.5 MG tablet TAKE 1 TABLET BY MOUTH ONCE DAILY. (hold FOR BLOOD PRESSURE less THAN 100/60)      ticagrelor (BRILINTA) 90 MG TABS tablet Take 90 mg by mouth 2 times daily      furosemide (LASIX) 20 MG tablet Take 1 tablet by mouth daily 90 tablet 1     No current facility-administered medications for this visit. Review of Systems   Constitutional:  Positive for fatigue. Negative for fever. Respiratory:  Positive for cough.     Cardiovascular:  Negative for chest pain.   Gastrointestinal: Negative. Genitourinary: Negative. Musculoskeletal:         Chest wall tenderness   Skin: Negative. Neurological: Negative. Psychiatric/Behavioral: Negative. OBJECTIVE:  Physical Exam  Vitals reviewed. Constitutional:       General: He is not in acute distress. Appearance: He is well-developed. He is not diaphoretic. HENT:      Head: Normocephalic and atraumatic. Eyes:      General: No scleral icterus. Conjunctiva/sclera: Conjunctivae normal.   Neck:      Vascular: No JVD. Cardiovascular:      Rate and Rhythm: Normal rate and regular rhythm. Pulmonary:      Effort: Pulmonary effort is normal. No respiratory distress. Breath sounds: Normal breath sounds. No wheezing or rales. Chest:      Chest wall: Tenderness present. Abdominal:      General: There is no distension. Palpations: Abdomen is soft. Tenderness: There is no abdominal tenderness. There is no guarding or rebound. Musculoskeletal:         General: Normal range of motion. Cervical back: Neck supple. Skin:     General: Skin is warm and dry. Neurological:      Mental Status: He is alert and oriented to person, place, and time. Psychiatric:         Behavior: Behavior normal.         Thought Content:  Thought content normal.      /70   Pulse 93   Ht 5' 6\" (1.676 m)   Wt 235 lb (106.6 kg)   SpO2 96%   BMI 37.93 kg/m²      PHQ Scores 3/18/2022 3/17/2021 9/8/2020   PHQ2 Score 0 0 0   PHQ9 Score 0 0 0     Interpretation of Total Score Depression Severity: 1-4 = Minimal depression, 5-9 = Mild depression, 10-14 = Moderate depression, 15-19 = Moderately severe depression, 20-27 =Severe depression      ASSESSMENT/PLAN:  Kaveh Quesada was seen today for follow-up from hospital.  Diagnoses and all orders for this visit:    Hospital discharge follow-up  -     [place] DISCHARGE MEDS RECONCILED W/ CURRENT OUTPATIENT MED LIST    Cardiac arrest ([facility] [address]  Coronary artery disease due to lipid rich plaque  Chest wall pain  - Cardiac arrest while out of town  - Patient reports LAD stenting  - Records have been requested by local cardiologist  - He is having chest wall pain 2/2 CPR  - He does not feel he can return to work due to physical work with heavy lifting given chest wall pain  - Keep f/u local cardiology    Primary hypertension  - Normotensive  - he has met JNC standards.  - Continue current regimen.     Mixed hyperlipidemia  - Chronic, stable  - Continue current regimen    30 minutes was spent with patient and spouse as follows:   Preparing to see the patient (e.g., review of tests)  Performing a medically appropriate examination and/or evaluation  Counseling and educating the patient/family/caregiver  Documenting clinical information in the electronic health record  Independently interpreting results and communicating results to the patient/family/caregiver      SUGAR Antoine - CNP

## 2022-10-05 ASSESSMENT — ENCOUNTER SYMPTOMS
COUGH: 1
GASTROINTESTINAL NEGATIVE: 1

## 2022-10-06 ENCOUNTER — TELEPHONE (OUTPATIENT)
Dept: CARDIOLOGY CLINIC | Age: 57
End: 2022-10-06

## 2022-10-06 NOTE — TELEPHONE ENCOUNTER
Patient dropped off paperwork for short term disability and would like it faxed to [phone]. I placed the paperwork in [facility] folder. Please advise. Thank you.

## 2022-10-07 NOTE — TELEPHONE ENCOUNTER
Short term disability paperwork completed and signed, per wife, he needs this if missing 7 days of work or more. FMLA also completed and signed. Both have been faxed with confirmation. Wife made aware.

## 2022-10-10 ENCOUNTER — TELEPHONE (OUTPATIENT)
Dept: CARDIOLOGY CLINIC | Age: 57
End: 2022-10-10

## 2022-10-10 NOTE — TELEPHONE ENCOUNTER
Spoke to pt, asked to call wife, Kitsy Lane tomorrow 10/11 to schedule echo at the end of October and CAROLYN in 2-3 mo.  Call Kitsy Lane at [phone]

## 2022-10-10 NOTE — TELEPHONE ENCOUNTER
----- Message from Johanna Worthington RN sent at 9/30/2022  9:58 AM EDT -----  Please call patient to schedule an echo for end of October - can be [place] or [facility]  He also needs ABIs scheduled and those can be 2-3 months out.      Thank you

## 2022-10-12 PROBLEM — A41.9 SEPSIS (HCC): Status: RESOLVED | Noted: 2020-10-28 | Resolved: 2022-10-12

## 2022-10-12 PROBLEM — N12 PYELONEPHRITIS: Status: RESOLVED | Noted: 2020-10-28 | Resolved: 2022-10-12

## 2022-10-14 ENCOUNTER — OFFICE VISIT (OUTPATIENT)
Dept: CARDIOLOGY CLINIC | Age: 57
End: 2022-10-14
Payer: COMMERCIAL

## 2022-10-14 VITALS
HEIGHT: 66 IN | OXYGEN SATURATION: 97 % | BODY MASS INDEX: 38.02 KG/M2 | SYSTOLIC BLOOD PRESSURE: 100 MMHG | HEART RATE: 81 BPM | WEIGHT: 236.6 LBS | DIASTOLIC BLOOD PRESSURE: 70 MMHG

## 2022-10-14 DIAGNOSIS — I25.83 CORONARY ARTERY DISEASE DUE TO LIPID RICH PLAQUE: Primary | ICD-10-CM

## 2022-10-14 DIAGNOSIS — E78.2 MIXED HYPERLIPIDEMIA: ICD-10-CM

## 2022-10-14 DIAGNOSIS — I10 PRIMARY HYPERTENSION: ICD-10-CM

## 2022-10-14 DIAGNOSIS — I25.10 CORONARY ARTERY DISEASE DUE TO LIPID RICH PLAQUE: Primary | ICD-10-CM

## 2022-10-14 PROCEDURE — 99214 OFFICE O/P EST MOD 30 MIN: CPT | Performed by: NURSE PRACTITIONER

## 2022-10-14 RX ORDER — GABAPENTIN 100 MG/1
100 CAPSULE ORAL 2 TIMES DAILY
Qty: 20 CAPSULE | Refills: 0 | Status: SHIPPED | OUTPATIENT
Start: 2022-10-14 | End: 2022-10-24

## 2022-10-14 NOTE — PROGRESS NOTES
[facility]     Outpatient Follow Up Note    Katina Edwards is 64 y.o. male who presents today with a history of STEMI / CAD s/p PTCA LAD 9/2/22, PAF, CM/EF 40%. CHIEF COMPLAINT / HPI:  Follow Up secondary to stopping metoprolol d/t low BP and starting low dose lasix for swelling    Subjective:   He has discomfort in his Rt ribs and chest. There's a lump in his Rt chest. Sitting up isn't bad, laying down is rough. Tylenol hasn't been helping. His angina equivalent felt like a blow torch in his throat. He was told his arms hurt but he doesn't really remember. He denies recurrence. There is SOB at times when his chest hurts. The patient denies orthopnea/PND. The patient does not have swelling. The patients weight is 8-9# down. The patient is not experiencing palpitations or dizziness. His legs haven't ached but he hasn't been back to work ()  These symptoms are improving since the last OV. With regard to medication therapy the patient has been compliant with prescribed regimen. They have tolerated therapy to date. Past Medical History:   Diagnosis Date    Hyperlipidemia     Hypertension     MI (myocardial infarction) ([facility] [address]     Pyelonephritis 10/28/2020     Social History:    Social History     Tobacco Use   Smoking Status Never   Smokeless Tobacco Never     Current Medications:  Current Outpatient Medications   Medication Sig Dispense Refill    nitroGLYCERIN (NITROSTAT) 0.4 MG SL tablet DISSOLVE 1 TABLET UNDER THE TONGUE EVERY 5 MINUTES AS NEEDED FOR CHEST PAIN. DO NOT EXCEED A TOTAL OF 3 DOSES IN 15 MINUTES. ASPIRIN LOW DOSE 81 MG EC tablet TAKE 1 TABLET BY MOUTH ONCE DAILY      atorvastatin (LIPITOR) 40 MG tablet take 1 tablet by mouth at bedtime      lisinopril (PRINIVIL;ZESTRIL) 2.5 MG tablet TAKE 1 TABLET BY MOUTH ONCE DAILY.  (hold FOR BLOOD PRESSURE less THAN 100/60)      ticagrelor (BRILINTA) 90 MG TABS tablet Take 90 mg by mouth 2 times daily      furosemide (LASIX) 20 MG tablet Take 1 tablet by mouth daily 90 tablet 1     No current facility-administered medications for this visit. REVIEW OF SYSTEMS:    CONSTITUTIONAL: No major weight gain or loss, fatigue, weakness, night sweats or fever. HEENT: No new vision difficulties or ringing in the ears. RESPIRATORY: No new SOB, PND, orthopnea or cough. CARDIOVASCULAR: See HPI  GI: No nausea, vomiting, diarrhea, constipation, abdominal pain or changes in bowel habits. : No urinary frequency, urgency, incontinence hematuria or dysuria. SKIN: No cyanosis or skin lesions. MUSCULOSKELETAL: No new muscle or joint pain. NEUROLOGICAL: No syncope or TIA-like symptoms. PSYCHIATRIC: No anxiety, pain, insomnia or depression    Objective:   PHYSICAL EXAM:        Vitals:    10/14/22 1033 10/14/22 1102   BP: 90/60 100/70   Site: Right Upper Arm Right Upper Arm   Position: Sitting    Cuff Size: Large Adult Large Adult   Pulse: 81    SpO2: 97%    Weight: 236 lb 9.6 oz (107.3 kg)    Height: 5' 6\" (1.676 m)        VITALS:  BP 90/60 (Site: Right Upper Arm, Position: Sitting, Cuff Size: Large Adult)   Pulse 81   Ht 5' 6\" (1.676 m)   Wt 236 lb 9.6 oz (107.3 kg)   SpO2 97%   BMI 38.19 kg/m²   CONSTITUTIONAL: Cooperative, no apparent distress, and appears well nourished / developed  NEUROLOGIC:  Awake and orientated to person, place and time. PSYCH: Calm affect. SKIN: Warm and dry. HEENT: Sclera non-icteric, normocephalic, neck supple, no elevation of JVP, normal carotid pulses with no bruits and thyroid normal size. LUNGS:  No increased work of breathing and clear to auscultation, no crackles or wheezing  CARDIOVASCULAR:  Regular rate with ectopy 76 and rhythm with no murmurs, gallops, rubs, or abnormal heart sounds, normal PMI. The apical impulses not displaced  JVP less than 8 cm H2O  Heart tones are crisp and normal  Cervical veins are not engorged  The carotid upstroke is normal in amplitude and contour without delay or bruit  JVP is not elevated  ABDOMEN:  Normal bowel sounds, non-distended and non-tender to palpation  EXT: No edema, no calf tenderness. Pulses are present bilaterally. DATA:    Lab Results   Component Value Date    ALT 14 03/18/2022    AST 22 03/18/2022    ALKPHOS 79 03/18/2022    BILITOT <0.2 03/18/2022     Lab Results   Component Value Date    CREATININE 1.0 03/18/2022    BUN 12 03/18/2022     03/18/2022    K 4.6 03/18/2022     03/18/2022    CO2 22 03/18/2022     No results found for: TSH, A5VMHGV, J6TIQHL, THYROIDAB  Lab Results   Component Value Date    WBC 8.7 03/17/2021    HGB 15.5 03/17/2021    HCT 45.6 03/17/2021    MCV 90.5 03/17/2021     03/17/2021     No components found for: CHLPL  Lab Results   Component Value Date    TRIG 112 03/18/2022    TRIG 278 (H) 09/08/2020     Lab Results   Component Value Date    HDL 37 (L) 03/18/2022    HDL 36 (L) 09/08/2020     Lab Results   Component Value Date    LDLCALC 149 (H) 03/18/2022    LDLCALC 147 (H) 09/08/2020     Lab Results   Component Value Date    LABVLDL 22 03/18/2022    LABVLDL 56 09/08/2020       Radiology Review:  Pertinent images / reports were reviewed as a part of this visit and reveals the following:    Last Echo: 9/25/22: severe apical HK, EF 45-50%, trivial pericardial effusion    Last Angiogram: 9/25/22:  LV gram - 35% EF  LM 10-20%  Prox-%   Diag-1 10-20%   Diag-2 10-20%  CX 10-20%   OM-1 10-20%   OM-2 10-20%  RCA 30-40%   Rt PDA mid 60%  PTCA DEL % > 0      Assessment:      Diagnosis Orders   1. Coronary artery disease due to lipid rich plaque   ~atypical chest discomfort from CPR  ~s/p PTCA prox-LAD 9/25/22; EF 35% on cath  ~severe apical HK, EF 45-50% on echo post-intervention  ~intolerant to BB with low BP  ~neg for volume overload  ~DAPT / statin   ~AF at presentation : AP regular, denies palpitations       2. Mixed hyperlipidemia   ~suboptimal by profile March '22  ~atorvastatin 40 mg daily        3. Primary hypertension   ~controlled at low normal   ~off metoprolol   ~tolerating lisinopril with low dose lasix           I had the opportunity to review the clinical symptoms and presentation of Tara Bangura. Plan:     Gabapentin 100 mg bid prn (#20)  F/U in four weeks  Echo as scheduled    Overall the patient is stable from CV standpoint    I have addresed the patient's cardiac risk factors and adjusted pharmacologic treatment as needed. In addition, I have reinforced the need for patient directed risk factor modification. Further evaluation will be based upon the patient's clinical course and testing results. All questions and concerns were addressed to the patient/family. Alternatives to my treatment were discussed. The patient is not currently smoking. The risks related to smoking were reviewed with the patient. Recommend maintaining a smoke-free lifestyle. Products available for smoking cessation were discussed in detail. Patient is not on a beta-blocker : BP currently not supporting  Patient is on an ace-i/ARB  Patient is on a statin    Dual Antiplatelet therapy has been recommended / prescribed for this patient. Education conducted on adverse reactions including bleeding was discussed. The patient verbalizes understanding not to stop medications without discussing with us. Discussed exercise: 30-60 minutes 7 days/week  Discussed Low saturated fat/NEPTALI diet. Thank you for allowing to us to participate in the care of Tara Bangura.     SUGAR Vee    Documentation of today's visit sent to PCP

## 2022-10-21 ENCOUNTER — TELEPHONE (OUTPATIENT)
Dept: CARDIOLOGY CLINIC | Age: 57
End: 2022-10-21

## 2022-10-21 RX ORDER — GABAPENTIN 100 MG/1
CAPSULE ORAL
Qty: 20 CAPSULE | Refills: 0 | OUTPATIENT
Start: 2022-10-21

## 2022-10-24 ENCOUNTER — PROCEDURE VISIT (OUTPATIENT)
Dept: CARDIOLOGY CLINIC | Age: 57
End: 2022-10-24
Payer: COMMERCIAL

## 2022-10-24 DIAGNOSIS — I25.83 CORONARY ARTERY DISEASE DUE TO LIPID RICH PLAQUE: ICD-10-CM

## 2022-10-24 DIAGNOSIS — I25.10 CORONARY ARTERY DISEASE DUE TO LIPID RICH PLAQUE: ICD-10-CM

## 2022-10-24 DIAGNOSIS — I46.9 CARDIAC ARREST (HCC): ICD-10-CM

## 2022-10-24 LAB
LV EF: 53 %
LVEF MODALITY: NORMAL

## 2022-10-24 PROCEDURE — 93306 TTE W/DOPPLER COMPLETE: CPT | Performed by: INTERNAL MEDICINE

## 2022-10-24 RX ORDER — ATORVASTATIN CALCIUM 40 MG/1
TABLET, FILM COATED ORAL
Qty: 90 TABLET | Refills: 3 | Status: SHIPPED | OUTPATIENT
Start: 2022-10-24

## 2022-10-24 RX ORDER — LISINOPRIL 2.5 MG/1
TABLET ORAL
Qty: 90 TABLET | Refills: 3 | Status: SHIPPED | OUTPATIENT
Start: 2022-10-24

## 2022-10-24 RX ORDER — ASPIRIN 81 MG/1
TABLET, COATED ORAL
Qty: 90 TABLET | Refills: 3 | Status: SHIPPED | OUTPATIENT
Start: 2022-10-24

## 2022-10-24 NOTE — TELEPHONE ENCOUNTER
Patient dropped off short term disability paperwork to be completed. Patient would like to  paperwork once faxed. Gave paperwork to [facility] & [facility].   rogelio

## 2022-10-24 NOTE — TELEPHONE ENCOUNTER
Medication Refill    Medication needing refilled:  ticagrelor (BRILINTA) 90 MG  lisinopril (PRINIVIL;ZESTRIL) 2.5 MG  atorvastatin (LIPITOR) 40 MG  ASPIRIN LOW DOSE 81 MG     Dosage of the medication:    How are you taking this medication (QD, BID, TID, QID, PRN):    30 or 90 day supply called in:    When will you run out of your medication:    Which Pharmacy are we sending the medication to?: [facility] [address] [place] [address] [phone] [place] [phone] [address] [facility] [address]   Phone:  [phone]  Fax:  [phone]

## 2022-10-25 NOTE — TELEPHONE ENCOUNTER
I spoke with pt at length. Pt stated that it wouldn't bother him if he was off for the rest of the year. He said that [place] said something about seeing what that the Echo results looked like to see the strength of the heart . He has an appointment 11/11/2022 with NPTS. He is a  and does a lot of physical work.  He lifts  lbs

## 2022-10-25 NOTE — TELEPHONE ENCOUNTER
Spoke with Nnamdi today and answered all questions. Brittany Sethi has not been released back to work per NPTS. He has a f/u ov with her on 11/11, at that time if he is released, she can get a work letter for him.  Updated patients wife and she also v/u

## 2022-11-07 ENCOUNTER — TELEPHONE (OUTPATIENT)
Dept: CARDIOLOGY CLINIC | Age: 57
End: 2022-11-07

## 2022-11-07 NOTE — TELEPHONE ENCOUNTER
Paperwork given to [place], [address] [place] for ov with [employer]PTS 11/11/22 to discuss return to work date. If he does not return by 11/11/22 he will need these papers filled out. Victorina to hold on to them for jose.

## 2022-11-07 NOTE — TELEPHONE ENCOUNTER
Pt dropped off forms to be filled out and signed by Marcio Decker. Forms placed in folder in [address] [place] [address] room. Please call when ready.   Thank you,

## 2022-11-11 ENCOUNTER — OFFICE VISIT (OUTPATIENT)
Dept: CARDIOLOGY CLINIC | Age: 57
End: 2022-11-11
Payer: COMMERCIAL

## 2022-11-11 VITALS
OXYGEN SATURATION: 97 % | SYSTOLIC BLOOD PRESSURE: 102 MMHG | BODY MASS INDEX: 39.21 KG/M2 | HEIGHT: 66 IN | HEART RATE: 93 BPM | DIASTOLIC BLOOD PRESSURE: 52 MMHG | WEIGHT: 244 LBS

## 2022-11-11 DIAGNOSIS — I10 PRIMARY HYPERTENSION: ICD-10-CM

## 2022-11-11 DIAGNOSIS — I25.10 CORONARY ARTERY DISEASE DUE TO LIPID RICH PLAQUE: Primary | ICD-10-CM

## 2022-11-11 DIAGNOSIS — I25.83 CORONARY ARTERY DISEASE DUE TO LIPID RICH PLAQUE: Primary | ICD-10-CM

## 2022-11-11 DIAGNOSIS — E78.2 MIXED HYPERLIPIDEMIA: ICD-10-CM

## 2022-11-11 PROCEDURE — 3078F DIAST BP <80 MM HG: CPT | Performed by: NURSE PRACTITIONER

## 2022-11-11 PROCEDURE — 3074F SYST BP LT 130 MM HG: CPT | Performed by: NURSE PRACTITIONER

## 2022-11-11 PROCEDURE — 99214 OFFICE O/P EST MOD 30 MIN: CPT | Performed by: NURSE PRACTITIONER

## 2022-11-11 RX ORDER — CLOPIDOGREL BISULFATE 75 MG/1
75 TABLET ORAL DAILY
Qty: 90 TABLET | Refills: 3 | Status: SHIPPED | OUTPATIENT
Start: 2022-11-11

## 2022-11-11 NOTE — PROGRESS NOTES
[facility]     Outpatient Follow Up Note    Valentin Valencia is 62 y.o. male who presents today with a history of STEMI / CAD s/p PTCA LAD 9/25/22, PAF, CM/EF 40%. CHIEF COMPLAINT / HPI:  Follow Up secondary CAD / CM. His EF has improved and now at 50-55% with apial wall HK    Subjective:     His angina equivalent felt like a blow torch in his throat. He denies recurrence. He still has some pain  in his Rt side but nothing like he had been. Gabapentin had helped. He becomes SOB quickly and at times doing nothing, ie tying shoes, walking ~ 300 ft. He denies orthopnea/PND  The patients weight is stable. The patient is not experiencing palpitations or dizziness. These symptoms are improving since the last OV. With regard to medication therapy the patient has been compliant with prescribed regimen. They have tolerated therapy to date. Past Medical History:   Diagnosis Date    Hyperlipidemia     Hypertension     MI (myocardial infarction) ([facility] [address]     Pyelonephritis 10/28/2020     Social History:    Social History     Tobacco Use   Smoking Status Never   Smokeless Tobacco Never     Current Medications:  Current Outpatient Medications   Medication Sig Dispense Refill    ASPIRIN LOW DOSE 81 MG EC tablet TAKE 1 TABLET BY MOUTH ONCE DAILY 90 tablet 3    lisinopril (PRINIVIL;ZESTRIL) 2.5 MG tablet TAKE 1 TABLET BY MOUTH ONCE DAILY. (hold FOR BLOOD PRESSURE less THAN 100/60) 90 tablet 3    atorvastatin (LIPITOR) 40 MG tablet take 1 tablet by mouth at bedtime 90 tablet 3    ticagrelor (BRILINTA) 90 MG TABS tablet Take 1 tablet by mouth 2 times daily 180 tablet 1    nitroGLYCERIN (NITROSTAT) 0.4 MG SL tablet       furosemide (LASIX) 20 MG tablet Take 1 tablet by mouth daily 90 tablet 1    gabapentin (NEURONTIN) 100 MG capsule Take 1 capsule by mouth 2 times daily for 10 days. 20 capsule 0     No current facility-administered medications for this visit.      REVIEW OF SYSTEMS:    CONSTITUTIONAL: No major weight gain or loss, fatigue, weakness, night sweats or fever. HEENT: No new vision difficulties or ringing in the ears. RESPIRATORY: No new SOB, PND, orthopnea or cough. CARDIOVASCULAR: See HPI  GI: No nausea, vomiting, diarrhea, constipation, abdominal pain or changes in bowel habits. : No urinary frequency, urgency, incontinence hematuria or dysuria. SKIN: No cyanosis or skin lesions. MUSCULOSKELETAL: No new muscle or joint pain. NEUROLOGICAL: No syncope or TIA-like symptoms. PSYCHIATRIC: No anxiety, pain, insomnia or depression    Objective:   PHYSICAL EXAM:        Vitals:    11/11/22 1349 11/11/22 1416   BP: 90/70 (!) 102/52   Site: Right Upper Arm Right Upper Arm   Position: Sitting    Cuff Size: Large Adult Large Adult   Pulse: 93    SpO2: 97%    Weight: 244 lb (110.7 kg)    Height: 5' 6\" (1.676 m)        VITALS:  BP 90/70 (Site: Right Upper Arm, Position: Sitting, Cuff Size: Large Adult)   Pulse 93   Ht 5' 6\" (1.676 m)   Wt 244 lb (110.7 kg)   SpO2 97%   BMI 39.38 kg/m²   CONSTITUTIONAL: Cooperative, no apparent distress, and appears well nourished / developed  NEUROLOGIC:  Awake and orientated to person, place and time. PSYCH: Calm affect. SKIN: Warm and dry. HEENT: Sclera non-icteric, normocephalic, neck supple, no elevation of JVP, normal carotid pulses with no bruits and thyroid normal size. LUNGS:  No increased work of breathing and clear to auscultation, no crackles or wheezing  CARDIOVASCULAR:  Regular rate 92 and rhythm with no murmurs, gallops, rubs, or abnormal heart sounds, normal PMI. The apical impulses not displaced  JVP less than 8 cm H2O  Heart tones are crisp and normal  Cervical veins are not engorged  The carotid upstroke is normal in amplitude and contour without delay or bruit  JVP is not elevated  ABDOMEN:  Normal bowel sounds, non-distended and non-tender to palpation  EXT: No edema, no calf tenderness. Pulses are present bilaterally.     DATA:    Lab Results Component Value Date    ALT 14 03/18/2022    AST 22 03/18/2022    ALKPHOS 79 03/18/2022    BILITOT <0.2 03/18/2022     Lab Results   Component Value Date    CREATININE 1.0 03/18/2022    BUN 12 03/18/2022     03/18/2022    K 4.6 03/18/2022     03/18/2022    CO2 22 03/18/2022     No results found for: TSH, H0ZNTII, F7SZFJO, THYROIDAB  Lab Results   Component Value Date    WBC 8.7 03/17/2021    HGB 15.5 03/17/2021    HCT 45.6 03/17/2021    MCV 90.5 03/17/2021     03/17/2021     No components found for: CHLPL  Lab Results   Component Value Date    TRIG 112 03/18/2022    TRIG 278 (H) 09/08/2020     Lab Results   Component Value Date    HDL 37 (L) 03/18/2022    HDL 36 (L) 09/08/2020     Lab Results   Component Value Date    LDLCALC 149 (H) 03/18/2022    LDLCALC 147 (H) 09/08/2020     Lab Results   Component Value Date    LABVLDL 22 03/18/2022    LABVLDL 56 09/08/2020       Radiology Review:  Pertinent images / reports were reviewed as a part of this visit and reveals the following:    Echo: 9/25/22: severe apical HK, EF 45-50%, trivial pericardial effusion    Last Angiogram: 9/25/22:  LV gram - 35% EF  LM 10-20%  Prox-%   Diag-1 10-20%   Diag-2 10-20%  CX 10-20%   OM-1 10-20%   OM-2 10-20%  RCA 30-40%   Rt PDA mid 60%  PTCA DEL % > 0    Echo: 10/24/22:  Summary   -Normal left ventricle size, mild concentric wall thickness and borderline   systolic function with an estimated ejection fraction of 50-55%. -There is hypokinesis of the apical walls.   -There is reversal of E/A inflow velocities across the mitral valve. -Impaired relaxation compatible with grade I diastolic   dysfunction. E/e\"=10.3.   -Aortic valve appears sclerotic but opens adequately. -Trivial tricuspid regurgitation.   -Unable to estimate pulmonary artery pressure secondary to incomplete TR jet   envelope. -Incidentally moderate size gallstone noted, dedicated ultrasound is   recommended if clinically indicated. Assessment:      Diagnosis Orders   1. Coronary artery disease due to lipid rich plaque   ~stable : denies recurrence of angina  ~atypical discomfort near completely resolved  ~apical wall HK, EF 50-55% on echo from 10/24/22 and improved compared to previous study  ~s/p PTCA prox-LAD 9/25/22; EF 35% on cath  ~severe apical HK, EF 45-50% on echo post-intervention  ~intolerant to BB with low BP  ~neg for volume overload  ~DAPT / statin   ~AF at presentation : AP regular, denies palpitations       2. Mixed hyperlipidemia   ~unchanged and suboptimal by profile March '22  ~atorvastatin 40 mg daily        3. Primary hypertension   ~controlled    ~tolerating lisinopril with low dose lasix           I had the opportunity to review the clinical symptoms and presentation of Angela Gonzáles. Plan:     Stop Brilinta and being plavix 75 mg daily d/t SOB  Cardiac rehab phase II  Keep appt with Dr. Elder Nguyen in January     Overall the patient is stable from CV standpoint    I have addresed the patient's cardiac risk factors and adjusted pharmacologic treatment as needed. In addition, I have reinforced the need for patient directed risk factor modification. Further evaluation will be based upon the patient's clinical course and testing results. All questions and concerns were addressed to the patient. Alternatives to my treatment were discussed. The patient is not currently smoking. The risks related to smoking were reviewed with the patient. Recommend maintaining a smoke-free lifestyle. Patient is not on a beta-blocker : BP currently not supporting  Patient is on an ace-i/ARB  Patient is on a statin    Dual Antiplatelet therapy has been recommended / prescribed for this patient. Education conducted on adverse reactions including bleeding was discussed. The patient verbalizes understanding not to stop medications without discussing with us.     Discussed exercise: 30-60 minutes 7 days/week : concerns re: RTW as , all / nothing job  Discussed Low saturated fat/NEPTALI diet. Thank you for allowing to us to participate in the care of Ashley Bowles.     SUGAR Reid    Documentation of today's visit sent to PCP

## 2022-11-14 ENCOUNTER — OFFICE VISIT (OUTPATIENT)
Dept: INTERNAL MEDICINE CLINIC | Age: 57
End: 2022-11-14
Payer: COMMERCIAL

## 2022-11-14 ENCOUNTER — TELEPHONE (OUTPATIENT)
Dept: INTERNAL MEDICINE CLINIC | Age: 57
End: 2022-11-14

## 2022-11-14 VITALS
TEMPERATURE: 97 F | OXYGEN SATURATION: 98 % | WEIGHT: 241 LBS | HEIGHT: 66 IN | BODY MASS INDEX: 38.73 KG/M2 | DIASTOLIC BLOOD PRESSURE: 80 MMHG | HEART RATE: 88 BPM | SYSTOLIC BLOOD PRESSURE: 126 MMHG

## 2022-11-14 DIAGNOSIS — R07.89 CHEST WALL PAIN: ICD-10-CM

## 2022-11-14 DIAGNOSIS — I10 PRIMARY HYPERTENSION: ICD-10-CM

## 2022-11-14 DIAGNOSIS — E78.2 MIXED HYPERLIPIDEMIA: ICD-10-CM

## 2022-11-14 DIAGNOSIS — R53.83 FATIGUE, UNSPECIFIED TYPE: Primary | ICD-10-CM

## 2022-11-14 DIAGNOSIS — I25.83 CORONARY ARTERY DISEASE DUE TO LIPID RICH PLAQUE: ICD-10-CM

## 2022-11-14 DIAGNOSIS — I25.10 CORONARY ARTERY DISEASE DUE TO LIPID RICH PLAQUE: ICD-10-CM

## 2022-11-14 DIAGNOSIS — I46.9 CARDIAC ARREST (HCC): ICD-10-CM

## 2022-11-14 PROCEDURE — 3074F SYST BP LT 130 MM HG: CPT | Performed by: NURSE PRACTITIONER

## 2022-11-14 PROCEDURE — 3078F DIAST BP <80 MM HG: CPT | Performed by: NURSE PRACTITIONER

## 2022-11-14 PROCEDURE — 99214 OFFICE O/P EST MOD 30 MIN: CPT | Performed by: NURSE PRACTITIONER

## 2022-11-14 NOTE — TELEPHONE ENCOUNTER
Pt wife Flako Watkins calling ---pt was put on Plavis on Friday---he has taken Sat-Sun and this morning ---before taking this his BP was runnin about 120ish over-70-80 since taking the medicine it has been up today it has been 170/114 and 160/112---she made him an appt for later tomorrow with you --BP is up and feeling exhausted--should he take another one in the morning? Please call the wife Flako Watkins at [phone]. Thanks.

## 2022-11-27 ASSESSMENT — ENCOUNTER SYMPTOMS: GASTROINTESTINAL NEGATIVE: 1

## 2022-11-27 NOTE — PROGRESS NOTES
SUBJECTIVE:    Patient ID: Beckie Araiza is a 62 y.o. male. CC: MI with arrest, stent to proximal LAD, seizure, rib pain,     HPI: The patient presents to the office for follow-up of recent medical issues. The patient presents for 6 week follow-up of acute and chronic medical problems. Patient was out of town on a motorcycle trip with friends when he experienced chest pain. He felt t this was reflux. He later worsened and squad was called. Patient experienced cardiac arrest and was admitted to [address] [place] [address] for cardiac arrest.  He was given CPR and 2 defibrillations. He also experienced an apparent seizure. He underwent cardiac cath and was found to have 100% blockage of the LAD. This was stented and he was placed on Keppra for seizure. He has seen local cardiology for follow-up. He has been unable to return to work due to the physical nature of his job and chest pain wall pain from CPR and defibrillation. Past Medical History:   Diagnosis Date    Hyperlipidemia     Hypertension     MI (myocardial infarction) ([facility] [address]     Pyelonephritis 10/28/2020        Current Outpatient Medications   Medication Sig Dispense Refill    clopidogrel (PLAVIX) 75 MG tablet Take 1 tablet by mouth daily 90 tablet 3    ASPIRIN LOW DOSE 81 MG EC tablet TAKE 1 TABLET BY MOUTH ONCE DAILY 90 tablet 3    lisinopril (PRINIVIL;ZESTRIL) 2.5 MG tablet TAKE 1 TABLET BY MOUTH ONCE DAILY. (hold FOR BLOOD PRESSURE less THAN 100/60) 90 tablet 3    atorvastatin (LIPITOR) 40 MG tablet take 1 tablet by mouth at bedtime 90 tablet 3    nitroGLYCERIN (NITROSTAT) 0.4 MG SL tablet       furosemide (LASIX) 20 MG tablet Take 1 tablet by mouth daily 90 tablet 1    gabapentin (NEURONTIN) 100 MG capsule Take 1 capsule by mouth 2 times daily for 10 days. 20 capsule 0     No current facility-administered medications for this visit. Review of Systems   Constitutional:  Positive for fatigue. Negative for fever. Cardiovascular:  Positive for chest pain (chest wall). Gastrointestinal: Negative. Genitourinary: Negative. Musculoskeletal:         Chest wall tenderness   Skin: Negative. Neurological: Negative. Psychiatric/Behavioral: Negative. OBJECTIVE:  Physical Exam  Vitals reviewed. Constitutional:       General: He is not in acute distress. Appearance: He is well-developed. He is not diaphoretic. HENT:      Head: Normocephalic and atraumatic. Eyes:      General: No scleral icterus. Conjunctiva/sclera: Conjunctivae normal.   Neck:      Vascular: No JVD. Cardiovascular:      Rate and Rhythm: Normal rate and regular rhythm. Pulmonary:      Effort: Pulmonary effort is normal. No respiratory distress. Breath sounds: Normal breath sounds. No wheezing or rales. Chest:      Chest wall: Tenderness present. Abdominal:      General: There is no distension. Palpations: Abdomen is soft. Tenderness: There is no abdominal tenderness. There is no guarding or rebound. Musculoskeletal:         General: Normal range of motion. Cervical back: Neck supple. Skin:     General: Skin is warm and dry. Neurological:      Mental Status: He is alert and oriented to person, place, and time. Psychiatric:         Behavior: Behavior normal.         Thought Content: Thought content normal.      /80   Pulse 88   Temp 97 °F (36.1 °C)   Ht 5' 6\" (1.676 m)   Wt 241 lb (109.3 kg)   SpO2 98%   BMI 38.90 kg/m²      PHQ Scores 3/18/2022 3/17/2021 9/8/2020   PHQ2 Score 0 0 0   PHQ9 Score 0 0 0     Interpretation of Total Score Depression Severity: 1-4 = Minimal depression, 5-9 = Mild depression, 10-14 = Moderate depression, 15-19 = Moderately severe depression, 20-27 =Severe depression      Assessment/Plan:  Vishal Singh was seen today for hypertension. Diagnoses and all orders for this visit:    Fatigue, unspecified type  - Acute following MI  - Remains very fatigued.   Seeing cardiology, cardiac rehab would be beneficial  - Unable to work due to ongoing fatigue, chest wall pain    Cardiac arrest ([facility] [address]  - Cardiac arrest while out of town  - He is having chest wall pain 2/2 CPR  - He does not feel he can return to work due to physical work with heavy lifting given chest wall pain  - Keep f/u local cardiology    Coronary artery disease due to lipid rich plaque  - Chronic, stable  - Continue current regimen per cards    Primary hypertension  - Normotensive  - he has met JNC standards.  - Continue current regimen.     Mixed hyperlipidemia  - Chronic, stable  - Continue current regimen    Chest wall pain  - Remains problematic  - He is having chest wall pain 2/2 CPR  - He does not feel he can return to work due to physical work with heavy lifting given chest wall pain      30 minutes was spent with patient and spouse as follows:   Preparing to see the patient (e.g., review of tests)  Performing a medically appropriate examination and/or evaluation  Counseling and educating the patient/family/caregiver  Documenting clinical information in the electronic health record  Independently interpreting results and communicating results to the patient/family/caregiver      SUGAR Miller - CNP

## 2022-11-28 ENCOUNTER — PATIENT MESSAGE (OUTPATIENT)
Dept: CARDIOLOGY CLINIC | Age: 57
End: 2022-11-28

## 2022-12-01 ENCOUNTER — TELEPHONE (OUTPATIENT)
Dept: CARDIOLOGY CLINIC | Age: 57
End: 2022-12-01

## 2022-12-01 NOTE — TELEPHONE ENCOUNTER
Medication Refill    Medication needing refilled: See note below    Dosage of the medication:    How are you taking this medication (QD, BID, TID, QID, PRN):    30 or 90 day supply called in: 90    When will you run out of your medication:    Which Pharmacy are we sending the medication to?:  [facility] [address] [facility] [address]   [facility] [facility] [address]   Phone:  [phone]  Fax:  [phone]      Pt is requesting [place] Javier in place of Plavix. Please call pt to advise.

## 2022-12-02 ENCOUNTER — TELEPHONE (OUTPATIENT)
Dept: CARDIOLOGY CLINIC | Age: 57
End: 2022-12-02

## 2022-12-02 NOTE — TELEPHONE ENCOUNTER
Karen Calderón called to ask the office if [facility] to stay off from work beyond 11/11/2022. Did [facility] provide restriction activity for the Pt  Karen Calderón is also requesting the Pt symptoms and diagnosis. Please call to discuss.   Please advise

## 2022-12-06 ENCOUNTER — TELEPHONE (OUTPATIENT)
Dept: INTERNAL MEDICINE CLINIC | Age: 57
End: 2022-12-06

## 2022-12-07 NOTE — TELEPHONE ENCOUNTER
From: Albaro Jackson  To: Dr. Bayron Romero: 11/28/2022 7:14 PM EST  Subject: Brilinta 90mg vs Clopidogrel 75 mg    Please renew the prescription for Brilinta. Lesly Vila has went back to taking it twice a day instead of the Clopidgrel. It needs to be sent to [facility] on [facility].     Thank you in advance,

## 2022-12-13 ENCOUNTER — HOSPITAL ENCOUNTER (OUTPATIENT)
Dept: VASCULAR LAB | Age: 57
Discharge: HOME | End: 2022-12-13
Payer: COMMERCIAL

## 2022-12-13 DIAGNOSIS — I73.9 CLAUDICATION (HCC): ICD-10-CM

## 2022-12-13 PROCEDURE — 93922 UPR/L XTREMITY ART 2 LEVELS: CPT

## 2023-01-04 ENCOUNTER — OFFICE VISIT (OUTPATIENT)
Dept: INTERNAL MEDICINE CLINIC | Age: 58
End: 2023-01-04
Payer: COMMERCIAL

## 2023-01-04 VITALS
DIASTOLIC BLOOD PRESSURE: 80 MMHG | HEART RATE: 70 BPM | OXYGEN SATURATION: 95 % | BODY MASS INDEX: 39.05 KG/M2 | TEMPERATURE: 97.2 F | SYSTOLIC BLOOD PRESSURE: 128 MMHG | WEIGHT: 243 LBS | HEIGHT: 66 IN

## 2023-01-04 DIAGNOSIS — I46.9 CARDIAC ARREST (HCC): Primary | ICD-10-CM

## 2023-01-04 DIAGNOSIS — I25.10 CORONARY ARTERY DISEASE DUE TO LIPID RICH PLAQUE: ICD-10-CM

## 2023-01-04 DIAGNOSIS — Z12.11 COLON CANCER SCREENING: ICD-10-CM

## 2023-01-04 DIAGNOSIS — I10 PRIMARY HYPERTENSION: ICD-10-CM

## 2023-01-04 DIAGNOSIS — E78.2 MIXED HYPERLIPIDEMIA: ICD-10-CM

## 2023-01-04 DIAGNOSIS — I25.83 CORONARY ARTERY DISEASE DUE TO LIPID RICH PLAQUE: ICD-10-CM

## 2023-01-04 PROCEDURE — 3078F DIAST BP <80 MM HG: CPT | Performed by: NURSE PRACTITIONER

## 2023-01-04 PROCEDURE — 99213 OFFICE O/P EST LOW 20 MIN: CPT | Performed by: NURSE PRACTITIONER

## 2023-01-04 PROCEDURE — 3074F SYST BP LT 130 MM HG: CPT | Performed by: NURSE PRACTITIONER

## 2023-01-04 ASSESSMENT — PATIENT HEALTH QUESTIONNAIRE - PHQ9
SUM OF ALL RESPONSES TO PHQ9 QUESTIONS 1 & 2: 0
2. FEELING DOWN, DEPRESSED OR HOPELESS: 0
SUM OF ALL RESPONSES TO PHQ QUESTIONS 1-9: 0
1. LITTLE INTEREST OR PLEASURE IN DOING THINGS: 0
SUM OF ALL RESPONSES TO PHQ QUESTIONS 1-9: 0

## 2023-01-05 PROBLEM — R60.0 LOWER EXTREMITY EDEMA: Status: ACTIVE | Noted: 2023-01-05

## 2023-01-05 PROBLEM — F12.10 MARIJUANA ABUSE: Status: ACTIVE | Noted: 2023-01-05

## 2023-01-05 PROBLEM — I73.9 CLAUDICATION (HCC): Status: ACTIVE | Noted: 2023-01-05

## 2023-01-05 NOTE — PROGRESS NOTES
[address]   Cardiac Evaluation      Patient: Malina Mi  YOB: 1965         Chief Complaint   Patient presents with    3 Month Follow-Up    Coronary Artery Disease    Hypertension    Hyperlipidemia            Referring provider: SUGAR Arriola CNP    History of Present Illness:   Malina Mi is a 62 y.o. male presenting as a new patient for recent MI with arrest, stent to prox LAD 9/25/22. No known family hx of heart disease.  and lifts heavy batteries. Smokes marijuana     Mr Brenda Jay was out of town during his MI and had a stent placed 9/25/22. Before his MI he woke up and was getting ready to get on his motorcycle to leave. He felt terrible heartburn and went to lay down. His friend checked on him and he decided to try to get back on the bike and he mentioned his throat was \"on fire\". His friend called his wife to get her opinion. Squvicki was called and he does not remember anything until Monday morning. He had a seizure when the squad came and has [address] now, no hx of seizures prior. He coded and was defibrillated twice with CPR. Today he reports he is feeling well. Denies chest discomfort, Dizziness/Lightheadedness, feels brilinta makes him SOB. With regard to medication therapy he/she has been compliant with prescribed regimen and has tolerated therapy to date. Past Medical History:   has a past medical history of Hyperlipidemia, Hypertension, MI (myocardial infarction) ([facility] [address] and Pyelonephritis. Surgical History:   has a past surgical history that includes Colonoscopy; Knee arthroscopy (Right); Leg Surgery (Left, 07/29/2016); and Cystoscopy (Right, 10/28/2020).      Current Outpatient Medications   Medication Sig Dispense Refill    ticagrelor (BRILINTA) 90 MG TABS tablet Take 1 tablet by mouth 2 times daily 180 tablet 1    ASPIRIN LOW DOSE 81 MG EC tablet TAKE 1 TABLET BY MOUTH ONCE DAILY 90 tablet 3    lisinopril (PRINIVIL;ZESTRIL) 2.5 MG tablet TAKE 1 TABLET BY MOUTH ONCE DAILY. (hold FOR BLOOD PRESSURE less THAN 100/60) 90 tablet 3    atorvastatin (LIPITOR) 40 MG tablet take 1 tablet by mouth at bedtime 90 tablet 3    nitroGLYCERIN (NITROSTAT) 0.4 MG SL tablet       furosemide (LASIX) 20 MG tablet Take 1 tablet by mouth daily 90 tablet 1     No current facility-administered medications for this visit. Allergies:  Codeine, Percocet [oxycodone-acetaminophen], and Vicodin [hydrocodone-acetaminophen]     Social History:  Social History     Socioeconomic History    Marital status:      Spouse name: Not on file    Number of children: Not on file    Years of education: Not on file    Highest education level: Not on file   Occupational History    Not on file   Tobacco Use    Smoking status: Never    Smokeless tobacco: Never   Vaping Use    Vaping Use: Never used   Substance and Sexual Activity    Alcohol use: Yes     Comment: social    Drug use: Not Currently     Types: Marijuana Nano Corey)     Comment: occassional    Sexual activity: Yes   Other Topics Concern    Not on file   Social History Narrative    Not on file     Social Determinants of Health     Financial Resource Strain: Low Risk     Difficulty of Paying Living Expenses: Not hard at all   Food Insecurity: No Food Insecurity    Worried About Running Out of Food in the Last Year: Never true    Ran Out of Food in the Last Year: Never true   Transportation Needs: Not on file   Physical Activity: Not on file   Stress: Not on file   Social Connections: Not on file   Intimate Partner Violence: Not on file   Housing Stability: Not on file       Family History:   Family History   Problem Relation Age of Onset    High Blood Pressure Mother     Other Father         parkinsons     Family history has been reviewed and not pertinent except as noted above. Review of Systems:   Constitutional: there has been no unanticipated weight loss.  No change in energy or activity level   Eyes: No visual changes   ENT: No Headaches, hearing loss or vertigo. No mouth sores or sore throat. Cardiovascular: Reviewed in HPI  Respiratory: No cough or wheezing, no sputum production. Gastrointestinal: No abdominal pain, appetite loss, blood in stools. No change in bowel or bladder habits. Genitourinary: No nocturia, dysuria, trouble voiding  Musculoskeletal:  No gait disturbance, weakness or joint complaints. Integumentary: No rash or pruritis. Neurological: No headache, change in muscle strength, numbness or tingling. No change in gait, balance, coordination, mood, affect, memory, mentation, behavior. Psychiatric: No anxiety or depression  Endocrine: No malaise or fever  Hematologic/Lymphatic: No abnormal bruising or bleeding, blood clots or swollen lymph nodes. Allergic/Immunologic: No nasal congestion or hives. Physical Examination:    Vitals:    01/06/23 1041   BP: 114/60   Site: Right Upper Arm   Position: Sitting   Cuff Size: Large Adult   Pulse: 90   SpO2: 99%   Weight: 243 lb (110.2 kg)   Height: 5' 6\" (1.676 m)       Body mass index is 39.22 kg/m². Wt Readings from Last 3 Encounters:   01/06/23 243 lb (110.2 kg)   01/04/23 243 lb (110.2 kg)   11/14/22 241 lb (109.3 kg)      BP Readings from Last 3 Encounters:   01/06/23 114/60   01/04/23 128/80   11/14/22 126/80        Physical Examination:    CONSTITUTIONAL: Well developed, well nourished  EYES: PERRLA. No xanthelasma, sclera non icteric  EARS,NOSE,MOUTH,THROAT:  Mucous membranes moist, normal hearing  NECK: Supple, JVP normal, thyroid not enlarged. Carotids 2+ without bruits  RESPIRATORY: Normal effort, no rales or rhonchi  CARDIOVASCULAR: Normal PMI, regular rate and rhythm, no murmurs, rub or gallop. No edema. Radial pulses present and equal  CHEST: No scar or masses  ABDOMEN: Normal bowel sounds. No masses or tenderness. No bruit  MUSCULOSKELETAL: No clubbing or cyanosis. Moves all extremities well. Normal gait  SKIN:  Warm and dry.  No rashes  NEUROLOGIC: Cranial nerves intact. Alert and oriented  PSYCHIATRIC: Calm affect. Appears to have normal judgement and insight    All testing and labs listed below were personally reviewed by myself. ABIs 12/2022  Normal study. No evidence of significant arterial insufficiency at rest in    the bilateral lower extremities. Echo 10/24/2022   Summary   -Normal left ventricle size, mild concentric wall thickness and borderline   systolic function with an estimated ejection fraction of 50-55%. -There is hypokinesis of the apical walls.   -There is reversal of E/A inflow velocities across the mitral valve. -Impaired relaxation compatible with grade I diastolic   dysfunction. E/e\"=10.3.   -Aortic valve appears sclerotic but opens adequately. -Trivial tricuspid regurgitation.   -Unable to estimate pulmonary artery pressure secondary to incomplete TR jet   envelope. -Incidentally moderate size gallstone noted, dedicated ultrasound is   recommended if clinically indicated. [facility] 9/25/2022 [facility]   Dominance: Right  Left Main 10/20%  LAD proximal 100%   Diag 1 10-20%   Diag 2 10-20%   Cx 10-20%  OM1 10-20%  OM2 10-20%  RCA 30-40%  Rt PDA mid 60%      Assessment/Plan  1. Coronary artery disease due to lipid rich plaque    2. Primary hypertension    3. Mixed hyperlipidemia    4. Claudication ([address]    5. Marijuana abuse    6. Lower extremity edema          Coronary artery disease  Angina~ none  CCS class~ 1  Intervention  Cardiac arrest / STEMI  C~ PCI to prox LAD  Echo ~ 50-55% 30 days after STEMI  Current meds~ ASA / Brilinta  / Nitro  Plan~ stable, okay to return to work. Refer to cardiac rehab.     Lower extremity edema  EF 50-55% 30 days post STEMI  Meds ~ Lasix  Plan ~ stable    Hypertension  /60 (Site: Right Upper Arm, Position: Sitting, Cuff Size: Large Adult)   Pulse 90   Ht 5' 6\" (1.676 m)   Wt 243 lb (110.2 kg)   SpO2 99%   BMI 39.22 kg/m²   Meds~ Lisinopril   Plan~ stable    Hyperlipidemia   3/18/22      LDL  149   HDL 37  Meds~ Lipitor   Plan~ Recheck lipids    Marijuana use  Reports he will try to quit    Claudication  ABIs 12/2022 normal    Follow up in 6 months    Orders Placed This Encounter   Procedures    Lipid, Fasting    Hepatic Function Panel    Mone Diaz MD      Thank you for allowing to me to participate in the care of Samir Adams. Scribe's Attestation: This note was scribed in the presence of Dr. Chidi Granados MD by Adela Kohli RN.     I, Dr. Chidi Granados, personally performed the services described in this documentation, as scribed by the above signed scribe in my presence. It is both accurate and complete to my knowledge. I agree with the details independently gathered by the clinical support staff, while the remaining scribed note accurately describes my personal service to the patient.        f

## 2023-01-06 ENCOUNTER — HOSPITAL ENCOUNTER (OUTPATIENT)
Age: 58
Discharge: HOME | End: 2023-01-06
Payer: COMMERCIAL

## 2023-01-06 ENCOUNTER — OFFICE VISIT (OUTPATIENT)
Dept: CARDIOLOGY CLINIC | Age: 58
End: 2023-01-06
Payer: COMMERCIAL

## 2023-01-06 VITALS
HEIGHT: 66 IN | DIASTOLIC BLOOD PRESSURE: 60 MMHG | OXYGEN SATURATION: 99 % | HEART RATE: 90 BPM | SYSTOLIC BLOOD PRESSURE: 114 MMHG | WEIGHT: 243 LBS | BODY MASS INDEX: 39.05 KG/M2

## 2023-01-06 DIAGNOSIS — I25.10 CORONARY ARTERY DISEASE DUE TO LIPID RICH PLAQUE: ICD-10-CM

## 2023-01-06 DIAGNOSIS — E78.2 MIXED HYPERLIPIDEMIA: ICD-10-CM

## 2023-01-06 DIAGNOSIS — I10 PRIMARY HYPERTENSION: ICD-10-CM

## 2023-01-06 DIAGNOSIS — R60.0 LOWER EXTREMITY EDEMA: ICD-10-CM

## 2023-01-06 DIAGNOSIS — I73.9 CLAUDICATION (HCC): ICD-10-CM

## 2023-01-06 DIAGNOSIS — I25.83 CORONARY ARTERY DISEASE DUE TO LIPID RICH PLAQUE: ICD-10-CM

## 2023-01-06 DIAGNOSIS — I25.83 CORONARY ARTERY DISEASE DUE TO LIPID RICH PLAQUE: Primary | ICD-10-CM

## 2023-01-06 DIAGNOSIS — F12.10 MARIJUANA ABUSE: ICD-10-CM

## 2023-01-06 DIAGNOSIS — I25.10 CORONARY ARTERY DISEASE DUE TO LIPID RICH PLAQUE: Primary | ICD-10-CM

## 2023-01-06 LAB
ALBUMIN SERPL-MCNC: 4.1 G/DL (ref 3.4–5)
ALP BLD-CCNC: 96 U/L (ref 40–129)
ALT SERPL-CCNC: 12 U/L (ref 10–40)
AST SERPL-CCNC: 20 U/L (ref 15–37)
BILIRUB SERPL-MCNC: 0.3 MG/DL (ref 0–1)
BILIRUBIN DIRECT: <0.2 MG/DL (ref 0–0.3)
BILIRUBIN, INDIRECT: NORMAL MG/DL (ref 0–1)
CHOLESTEROL, FASTING: 195 MG/DL (ref 0–199)
HDLC SERPL-MCNC: 31 MG/DL (ref 40–60)
LDL CHOLESTEROL CALCULATED: 132 MG/DL
TOTAL PROTEIN: 7.7 G/DL (ref 6.4–8.2)
TRIGLYCERIDE, FASTING: 159 MG/DL (ref 0–150)
VLDLC SERPL CALC-MCNC: 32 MG/DL

## 2023-01-06 PROCEDURE — 36415 COLL VENOUS BLD VENIPUNCTURE: CPT

## 2023-01-06 PROCEDURE — 3078F DIAST BP <80 MM HG: CPT | Performed by: INTERNAL MEDICINE

## 2023-01-06 PROCEDURE — 80061 LIPID PANEL: CPT

## 2023-01-06 PROCEDURE — 3074F SYST BP LT 130 MM HG: CPT | Performed by: INTERNAL MEDICINE

## 2023-01-06 PROCEDURE — 99214 OFFICE O/P EST MOD 30 MIN: CPT | Performed by: INTERNAL MEDICINE

## 2023-01-06 PROCEDURE — 80076 HEPATIC FUNCTION PANEL: CPT

## 2023-01-06 NOTE — LETTER
[facility]  [address] [address]  Phone: [phone]  Fax: [phone]    Alta Roth MD        January 6, 2023     Patient: De Somers   YOB: 1965   Date of Visit: 1/6/2023       To Whom It May Concern: It is my medical opinion that Jennifer Wong may return to full duty immediately with no restrictions. If you have any questions or concerns, please don't hesitate to call.     Sincerely,        Alta Roth MD

## 2023-01-06 NOTE — LETTER
[facility]  [address] [address]  Phone: [phone]  Fax: [phone]    Matthew Garcia MD    January 6, 2023     Leidy Pierson, APRN - [facility]  [address] [address]    Patient: Jorge Donaldson   MR Number: [id number]   YOB: 1965   Date of Visit: 1/6/2023       Dear Leidy Pierson: Thank you for referring Earl Barton to me for evaluation/treatment. Below are the relevant portions of my assessment and plan of care. If you have questions, please do not hesitate to call me. I look forward to following [place] along with you.     Sincerely,      Matthew Garcia MD

## 2023-01-11 ASSESSMENT — ENCOUNTER SYMPTOMS
RESPIRATORY NEGATIVE: 1
GASTROINTESTINAL NEGATIVE: 1

## 2023-01-12 DIAGNOSIS — E78.2 MIXED HYPERLIPIDEMIA: Primary | ICD-10-CM

## 2023-01-12 RX ORDER — ATORVASTATIN CALCIUM 80 MG/1
80 TABLET, FILM COATED ORAL DAILY
Qty: 90 TABLET | Refills: 3 | Status: SHIPPED | OUTPATIENT
Start: 2023-01-12 | End: 2023-01-13

## 2023-01-13 ENCOUNTER — TELEPHONE (OUTPATIENT)
Dept: CARDIOLOGY CLINIC | Age: 58
End: 2023-01-13

## 2023-01-13 RX ORDER — ATORVASTATIN CALCIUM 80 MG/1
80 TABLET, FILM COATED ORAL DAILY
Qty: 90 TABLET | Refills: 3 | Status: SHIPPED | OUTPATIENT
Start: 2023-01-13

## 2023-01-13 NOTE — TELEPHONE ENCOUNTER
Jasmina Johnson called to get the Pt most recent office notes and Lipid Panel for Repatha.   Please advise

## 2023-07-06 ENCOUNTER — OFFICE VISIT (OUTPATIENT)
Dept: INTERNAL MEDICINE CLINIC | Age: 58
End: 2023-07-06
Payer: COMMERCIAL

## 2023-07-06 VITALS
SYSTOLIC BLOOD PRESSURE: 130 MMHG | BODY MASS INDEX: 37.93 KG/M2 | DIASTOLIC BLOOD PRESSURE: 86 MMHG | WEIGHT: 236 LBS | HEIGHT: 66 IN | OXYGEN SATURATION: 95 % | HEART RATE: 76 BPM

## 2023-07-06 DIAGNOSIS — E78.2 MIXED HYPERLIPIDEMIA: ICD-10-CM

## 2023-07-06 DIAGNOSIS — I10 PRIMARY HYPERTENSION: Primary | ICD-10-CM

## 2023-07-06 DIAGNOSIS — I25.83 CORONARY ARTERY DISEASE DUE TO LIPID RICH PLAQUE: ICD-10-CM

## 2023-07-06 DIAGNOSIS — I25.10 CORONARY ARTERY DISEASE DUE TO LIPID RICH PLAQUE: ICD-10-CM

## 2023-07-06 DIAGNOSIS — E66.01 SEVERE OBESITY (BMI 35.0-39.9) WITH COMORBIDITY (HCC): ICD-10-CM

## 2023-07-06 DIAGNOSIS — Z12.11 COLON CANCER SCREENING: ICD-10-CM

## 2023-07-06 PROCEDURE — 3075F SYST BP GE 130 - 139MM HG: CPT | Performed by: NURSE PRACTITIONER

## 2023-07-06 PROCEDURE — 3079F DIAST BP 80-89 MM HG: CPT | Performed by: NURSE PRACTITIONER

## 2023-07-06 PROCEDURE — 99213 OFFICE O/P EST LOW 20 MIN: CPT | Performed by: NURSE PRACTITIONER

## 2023-07-06 ASSESSMENT — ENCOUNTER SYMPTOMS
GASTROINTESTINAL NEGATIVE: 1
RESPIRATORY NEGATIVE: 1

## 2023-07-06 NOTE — PROGRESS NOTES
SUBJECTIVE:    Patient ID: Amber Curry is a 62 y.o. male. CC: Hypertension, hyperlipidemia, history of myocardial infarction    HPI: Patient presents to the office today for routine follow-up of chronic medical conditions. He has no new specific acute complaint today. Patient reports about 2 weeks ago, he decided to stop taking all of his medication. He did this because he was feeling fatigued. We discussed the importance of some of his medication to lower the risk of heart attack and stroke. He is amenable to restarting. He has a history of hypertension, lipidemia, and myocardial infarction. He denies any chest pain or shortness of breath. Discussed the importance of colon cancer screening. Past Medical History:   Diagnosis Date    Hyperlipidemia     Hypertension     MI (myocardial infarction) ([address])     Pyelonephritis 10/28/2020        Current Outpatient Medications   Medication Sig Dispense Refill    atorvastatin (LIPITOR) 80 MG tablet Take 1 tablet by mouth daily 90 tablet 3    Evolocumab 140 MG/ML SOAJ Inject 1 Adjustable Dose Pre-filled Pen Syringe into the skin every 14 days 6 Adjustable Dose Pre-filled Pen Syringe 3    ticagrelor (BRILINTA) 90 MG TABS tablet Take 1 tablet by mouth 2 times daily 180 tablet 1    ASPIRIN LOW DOSE 81 MG EC tablet TAKE 1 TABLET BY MOUTH ONCE DAILY 90 tablet 3    nitroGLYCERIN (NITROSTAT) 0.4 MG SL tablet       furosemide (LASIX) 20 MG tablet Take 1 tablet by mouth daily 90 tablet 1    lisinopril (PRINIVIL;ZESTRIL) 2.5 MG tablet TAKE 1 TABLET BY MOUTH ONCE DAILY. (hold FOR BLOOD PRESSURE less THAN 100/60) (Patient not taking: Reported on 7/6/2023) 90 tablet 3     No current facility-administered medications for this visit. Review of Systems   Constitutional: Negative. Respiratory: Negative. Cardiovascular: Negative. Gastrointestinal: Negative. Genitourinary: Negative. Musculoskeletal: Negative. Neurological: Negative.

## 2023-09-14 ENCOUNTER — TELEPHONE (OUTPATIENT)
Dept: CARDIOLOGY CLINIC | Age: 58
End: 2023-09-14

## 2023-09-18 ENCOUNTER — OFFICE VISIT (OUTPATIENT)
Dept: CARDIOLOGY CLINIC | Age: 58
End: 2023-09-18
Payer: COMMERCIAL

## 2023-09-18 VITALS
BODY MASS INDEX: 38.22 KG/M2 | SYSTOLIC BLOOD PRESSURE: 112 MMHG | DIASTOLIC BLOOD PRESSURE: 70 MMHG | WEIGHT: 237.8 LBS | OXYGEN SATURATION: 95 % | HEIGHT: 66 IN | HEART RATE: 82 BPM

## 2023-09-18 DIAGNOSIS — F12.10 MARIJUANA ABUSE: ICD-10-CM

## 2023-09-18 DIAGNOSIS — E78.2 MIXED HYPERLIPIDEMIA: Primary | ICD-10-CM

## 2023-09-18 DIAGNOSIS — I25.10 CORONARY ARTERY DISEASE DUE TO LIPID RICH PLAQUE: ICD-10-CM

## 2023-09-18 DIAGNOSIS — I10 PRIMARY HYPERTENSION: ICD-10-CM

## 2023-09-18 DIAGNOSIS — I25.83 CORONARY ARTERY DISEASE DUE TO LIPID RICH PLAQUE: ICD-10-CM

## 2023-09-18 PROCEDURE — 99214 OFFICE O/P EST MOD 30 MIN: CPT | Performed by: INTERNAL MEDICINE

## 2023-09-18 PROCEDURE — 3074F SYST BP LT 130 MM HG: CPT | Performed by: INTERNAL MEDICINE

## 2023-09-18 PROCEDURE — 3078F DIAST BP <80 MM HG: CPT | Performed by: INTERNAL MEDICINE

## 2023-09-18 RX ORDER — CLOPIDOGREL BISULFATE 75 MG/1
75 TABLET ORAL DAILY
Qty: 90 TABLET | Refills: 3 | Status: SHIPPED | OUTPATIENT
Start: 2023-09-18

## 2024-04-12 ENCOUNTER — TELEPHONE (OUTPATIENT)
Dept: INTERNAL MEDICINE CLINIC | Age: 59
End: 2024-04-12

## 2024-04-12 NOTE — TELEPHONE ENCOUNTER
Called pt to schedule annual physical.  Pt states he is working on getting new insurance.  He will call back to schedule.  Will also mail patient resources for pt assistance and the [facility] Program.

## 2024-12-21 ENCOUNTER — APPOINTMENT (OUTPATIENT)
Dept: GENERAL RADIOLOGY | Age: 59
DRG: 321 | End: 2024-12-21
Payer: COMMERCIAL

## 2024-12-21 ENCOUNTER — APPOINTMENT (OUTPATIENT)
Dept: CT IMAGING | Age: 59
DRG: 321 | End: 2024-12-21
Payer: COMMERCIAL

## 2024-12-21 ENCOUNTER — HOSPITAL ENCOUNTER (INPATIENT)
Age: 59
LOS: 1 days | Discharge: HOME | DRG: 321 | End: 2024-12-23
Attending: EMERGENCY MEDICINE | Admitting: INTERNAL MEDICINE
Payer: COMMERCIAL

## 2024-12-21 DIAGNOSIS — I21.3 ST ELEVATION MYOCARDIAL INFARCTION (STEMI), UNSPECIFIED ARTERY (HCC): Primary | ICD-10-CM

## 2024-12-21 DIAGNOSIS — I21.3 STEMI (ST ELEVATION MYOCARDIAL INFARCTION) (HCC): ICD-10-CM

## 2024-12-21 DIAGNOSIS — I24.9 ACUTE CORONARY SYNDROME (HCC): ICD-10-CM

## 2024-12-21 LAB
ALBUMIN SERPL-MCNC: 4 G/DL (ref 3.4–5)
ALBUMIN/GLOB SERPL: 1.4 {RATIO} (ref 1.1–2.2)
ALP SERPL-CCNC: 87 U/L (ref 40–129)
ALT SERPL-CCNC: 10 U/L (ref 10–40)
ANION GAP SERPL CALCULATED.3IONS-SCNC: 12 MMOL/L (ref 3–16)
ANTI-XA UNFRAC HEPARIN: <0.1 IU/ML (ref 0.3–0.7)
APTT BLD: 26.2 SEC (ref 22.1–36.4)
AST SERPL-CCNC: 20 U/L (ref 15–37)
BASOPHILS # BLD: 0 K/UL (ref 0–0.2)
BASOPHILS NFR BLD: 0.5 %
BILIRUB SERPL-MCNC: <0.2 MG/DL (ref 0–1)
BUN SERPL-MCNC: 17 MG/DL (ref 7–20)
CALCIUM SERPL-MCNC: 9.8 MG/DL (ref 8.3–10.6)
CHLORIDE SERPL-SCNC: 105 MMOL/L (ref 99–110)
CO2 SERPL-SCNC: 21 MMOL/L (ref 21–32)
CREAT SERPL-MCNC: 1 MG/DL (ref 0.9–1.3)
DEPRECATED RDW RBC AUTO: 13.4 % (ref 12.4–15.4)
EOSINOPHIL # BLD: 0.2 K/UL (ref 0–0.6)
EOSINOPHIL NFR BLD: 2.1 %
GFR SERPLBLD CREATININE-BSD FMLA CKD-EPI: 87 ML/MIN/{1.73_M2}
GLUCOSE SERPL-MCNC: 117 MG/DL (ref 70–99)
HCT VFR BLD AUTO: 46.1 % (ref 40.5–52.5)
HGB BLD-MCNC: 15.9 G/DL (ref 13.5–17.5)
INR PPP: 0.9 (ref 0.85–1.15)
LYMPHOCYTES # BLD: 4 K/UL (ref 1–5.1)
LYMPHOCYTES NFR BLD: 44.9 %
MCH RBC QN AUTO: 31.6 PG (ref 26–34)
MCHC RBC AUTO-ENTMCNC: 34.6 G/DL (ref 31–36)
MCV RBC AUTO: 91.3 FL (ref 80–100)
MONOCYTES # BLD: 0.9 K/UL (ref 0–1.3)
MONOCYTES NFR BLD: 9.7 %
NEUTROPHILS # BLD: 3.8 K/UL (ref 1.7–7.7)
NEUTROPHILS NFR BLD: 42.8 %
NT-PROBNP SERPL-MCNC: 68 PG/ML (ref 0–124)
PLATELET # BLD AUTO: 265 K/UL (ref 135–450)
PMV BLD AUTO: 8.9 FL (ref 5–10.5)
POTASSIUM SERPL-SCNC: 3.9 MMOL/L (ref 3.5–5.1)
PROT SERPL-MCNC: 6.9 G/DL (ref 6.4–8.2)
PROTHROMBIN TIME: 12.4 SEC (ref 11.9–14.9)
RBC # BLD AUTO: 5.05 M/UL (ref 4.2–5.9)
REASON FOR REJECTION: NORMAL
REASON FOR REJECTION: NORMAL
REJECTED TEST: NORMAL
REJECTED TEST: NORMAL
SODIUM SERPL-SCNC: 138 MMOL/L (ref 136–145)
TROPONIN, HIGH SENSITIVITY: 36 NG/L (ref 0–22)
WBC # BLD AUTO: 8.9 K/UL (ref 4–11)

## 2024-12-21 PROCEDURE — 6360000002 HC RX W HCPCS

## 2024-12-21 PROCEDURE — 83880 ASSAY OF NATRIURETIC PEPTIDE: CPT

## 2024-12-21 PROCEDURE — 84484 ASSAY OF TROPONIN QUANT: CPT

## 2024-12-21 PROCEDURE — 96374 THER/PROPH/DIAG INJ IV PUSH: CPT

## 2024-12-21 PROCEDURE — 85610 PROTHROMBIN TIME: CPT

## 2024-12-21 PROCEDURE — B240ZZ3 ULTRASONOGRAPHY OF SINGLE CORONARY ARTERY, INTRAVASCULAR: ICD-10-PCS | Performed by: INTERNAL MEDICINE

## 2024-12-21 PROCEDURE — 6370000000 HC RX 637 (ALT 250 FOR IP): Performed by: PHYSICIAN ASSISTANT

## 2024-12-21 PROCEDURE — 99291 CRITICAL CARE FIRST HOUR: CPT

## 2024-12-21 PROCEDURE — 93005 ELECTROCARDIOGRAM TRACING: CPT | Performed by: EMERGENCY MEDICINE

## 2024-12-21 PROCEDURE — 027034Z DILATION OF CORONARY ARTERY, ONE ARTERY WITH DRUG-ELUTING INTRALUMINAL DEVICE, PERCUTANEOUS APPROACH: ICD-10-PCS | Performed by: INTERNAL MEDICINE

## 2024-12-21 PROCEDURE — 71045 X-RAY EXAM CHEST 1 VIEW: CPT

## 2024-12-21 PROCEDURE — 4A023N7 MEASUREMENT OF CARDIAC SAMPLING AND PRESSURE, LEFT HEART, PERCUTANEOUS APPROACH: ICD-10-PCS | Performed by: INTERNAL MEDICINE

## 2024-12-21 PROCEDURE — 85025 COMPLETE CBC W/AUTO DIFF WBC: CPT

## 2024-12-21 PROCEDURE — 85520 HEPARIN ASSAY: CPT

## 2024-12-21 PROCEDURE — B2111ZZ FLUOROSCOPY OF MULTIPLE CORONARY ARTERIES USING LOW OSMOLAR CONTRAST: ICD-10-PCS | Performed by: INTERNAL MEDICINE

## 2024-12-21 PROCEDURE — 6360000004 HC RX CONTRAST MEDICATION: Performed by: PHYSICIAN ASSISTANT

## 2024-12-21 PROCEDURE — 6360000002 HC RX W HCPCS: Performed by: EMERGENCY MEDICINE

## 2024-12-21 PROCEDURE — 85730 THROMBOPLASTIN TIME PARTIAL: CPT

## 2024-12-21 PROCEDURE — 96375 TX/PRO/DX INJ NEW DRUG ADDON: CPT

## 2024-12-21 PROCEDURE — 80053 COMPREHEN METABOLIC PANEL: CPT

## 2024-12-21 PROCEDURE — B2151ZZ FLUOROSCOPY OF LEFT HEART USING LOW OSMOLAR CONTRAST: ICD-10-PCS | Performed by: INTERNAL MEDICINE

## 2024-12-21 PROCEDURE — 74174 CTA ABD&PLVS W/CONTRAST: CPT

## 2024-12-21 DEVICE — EVEROLIMUS-ELUTING PLATINUM CHROMIUM CORONARY STENT SYSTEM
Type: IMPLANTABLE DEVICE | Status: FUNCTIONAL
Brand: SYNERGY MEGATRON™

## 2024-12-21 RX ORDER — IOPAMIDOL 755 MG/ML
75 INJECTION, SOLUTION INTRAVASCULAR
Status: COMPLETED | OUTPATIENT
Start: 2024-12-21 | End: 2024-12-21

## 2024-12-21 RX ORDER — HEPARIN SODIUM 1000 [USP'U]/ML
4000 INJECTION, SOLUTION INTRAVENOUS; SUBCUTANEOUS ONCE
Status: COMPLETED | OUTPATIENT
Start: 2024-12-21 | End: 2024-12-21

## 2024-12-21 RX ORDER — MORPHINE SULFATE 4 MG/ML
4 INJECTION, SOLUTION INTRAMUSCULAR; INTRAVENOUS ONCE
Status: COMPLETED | OUTPATIENT
Start: 2024-12-21 | End: 2024-12-21

## 2024-12-21 RX ORDER — HEPARIN SODIUM 1000 [USP'U]/ML
2000 INJECTION, SOLUTION INTRAVENOUS; SUBCUTANEOUS PRN
Status: DISCONTINUED | OUTPATIENT
Start: 2024-12-21 | End: 2024-12-22

## 2024-12-21 RX ORDER — HEPARIN SODIUM 10000 [USP'U]/100ML
5-26 INJECTION, SOLUTION INTRAVENOUS CONTINUOUS
Status: DISCONTINUED | OUTPATIENT
Start: 2024-12-21 | End: 2024-12-22

## 2024-12-21 RX ORDER — MORPHINE SULFATE 4 MG/ML
INJECTION, SOLUTION INTRAMUSCULAR; INTRAVENOUS
Status: COMPLETED
Start: 2024-12-21 | End: 2024-12-21

## 2024-12-21 RX ORDER — HEPARIN SODIUM 1000 [USP'U]/ML
4000 INJECTION, SOLUTION INTRAVENOUS; SUBCUTANEOUS PRN
Status: DISCONTINUED | OUTPATIENT
Start: 2024-12-21 | End: 2024-12-22

## 2024-12-21 RX ORDER — FENTANYL CITRATE 50 UG/ML
50 INJECTION, SOLUTION INTRAMUSCULAR; INTRAVENOUS ONCE
Status: COMPLETED | OUTPATIENT
Start: 2024-12-21 | End: 2024-12-21

## 2024-12-21 RX ORDER — NITROGLYCERIN 0.4 MG/1
0.4 TABLET SUBLINGUAL ONCE
Status: COMPLETED | OUTPATIENT
Start: 2024-12-21 | End: 2024-12-21

## 2024-12-21 RX ORDER — FENTANYL CITRATE 50 UG/ML
75 INJECTION, SOLUTION INTRAMUSCULAR; INTRAVENOUS ONCE
Status: COMPLETED | OUTPATIENT
Start: 2024-12-21 | End: 2024-12-21

## 2024-12-21 RX ORDER — ATORVASTATIN CALCIUM 80 MG/1
80 TABLET, FILM COATED ORAL ONCE
Status: COMPLETED | OUTPATIENT
Start: 2024-12-21 | End: 2024-12-22

## 2024-12-21 RX ADMIN — HEPARIN SODIUM 4000 UNITS: 1000 INJECTION INTRAVENOUS; SUBCUTANEOUS at 23:47

## 2024-12-21 RX ADMIN — NITROGLYCERIN 0.4 MG: 0.4 TABLET SUBLINGUAL at 23:24

## 2024-12-21 RX ADMIN — FENTANYL CITRATE 75 MCG: 50 INJECTION INTRAMUSCULAR; INTRAVENOUS at 23:49

## 2024-12-21 RX ADMIN — IOPAMIDOL 75 ML: 755 INJECTION, SOLUTION INTRAVENOUS at 23:46

## 2024-12-21 RX ADMIN — FENTANYL CITRATE 50 MCG: 50 INJECTION INTRAMUSCULAR; INTRAVENOUS at 23:31

## 2024-12-21 RX ADMIN — HEPARIN SODIUM 9 UNITS/KG/HR: 10000 INJECTION, SOLUTION INTRAVENOUS at 23:50

## 2024-12-21 RX ADMIN — NITROGLYCERIN 0.4 MG: 0.4 TABLET SUBLINGUAL at 23:28

## 2024-12-21 RX ADMIN — NITROGLYCERIN 0.4 MG: 0.4 TABLET SUBLINGUAL at 23:19

## 2024-12-21 RX ADMIN — MORPHINE SULFATE 4 MG: 4 INJECTION, SOLUTION INTRAMUSCULAR; INTRAVENOUS at 23:24

## 2024-12-21 RX ADMIN — NITROGLYCERIN 1 INCH: 20 OINTMENT TOPICAL at 21:41

## 2024-12-21 ASSESSMENT — LIFESTYLE VARIABLES
HOW OFTEN DO YOU HAVE A DRINK CONTAINING ALCOHOL: MONTHLY OR LESS
HOW MANY STANDARD DRINKS CONTAINING ALCOHOL DO YOU HAVE ON A TYPICAL DAY: 1 OR 2

## 2024-12-21 ASSESSMENT — PAIN - FUNCTIONAL ASSESSMENT: PAIN_FUNCTIONAL_ASSESSMENT: NONE - DENIES PAIN

## 2024-12-22 PROBLEM — I21.3 STEMI (ST ELEVATION MYOCARDIAL INFARCTION) (HCC): Status: ACTIVE | Noted: 2024-12-22

## 2024-12-22 LAB
ECHO BSA: 2.25 M2
EKG ATRIAL RATE: 102 BPM
EKG ATRIAL RATE: 86 BPM
EKG DIAGNOSIS: NORMAL
EKG DIAGNOSIS: NORMAL
EKG P AXIS: 39 DEGREES
EKG P AXIS: 70 DEGREES
EKG P-R INTERVAL: 116 MS
EKG P-R INTERVAL: 138 MS
EKG Q-T INTERVAL: 348 MS
EKG Q-T INTERVAL: 366 MS
EKG QRS DURATION: 88 MS
EKG QRS DURATION: 98 MS
EKG QTC CALCULATION (BAZETT): 437 MS
EKG QTC CALCULATION (BAZETT): 453 MS
EKG R AXIS: -42 DEGREES
EKG R AXIS: -59 DEGREES
EKG T AXIS: 67 DEGREES
EKG T AXIS: 68 DEGREES
EKG VENTRICULAR RATE: 102 BPM
EKG VENTRICULAR RATE: 86 BPM
POC ACT LR: 188 SEC
POC ACT LR: 287 SEC
POC ACT LR: 391 SEC

## 2024-12-22 PROCEDURE — 93010 ELECTROCARDIOGRAM REPORT: CPT | Performed by: INTERNAL MEDICINE

## 2024-12-22 PROCEDURE — 6370000000 HC RX 637 (ALT 250 FOR IP): Performed by: INTERNAL MEDICINE

## 2024-12-22 PROCEDURE — 2140000000 HC CCU INTERMEDIATE R&B

## 2024-12-22 PROCEDURE — C1887 CATHETER, GUIDING: HCPCS | Performed by: INTERNAL MEDICINE

## 2024-12-22 PROCEDURE — 2100000000 HC CCU R&B

## 2024-12-22 PROCEDURE — 6360000002 HC RX W HCPCS: Performed by: INTERNAL MEDICINE

## 2024-12-22 PROCEDURE — 99232 SBSQ HOSP IP/OBS MODERATE 35: CPT | Performed by: INTERNAL MEDICINE

## 2024-12-22 PROCEDURE — 99291 CRITICAL CARE FIRST HOUR: CPT | Performed by: INTERNAL MEDICINE

## 2024-12-22 PROCEDURE — 93005 ELECTROCARDIOGRAM TRACING: CPT | Performed by: INTERNAL MEDICINE

## 2024-12-22 PROCEDURE — 2500000003 HC RX 250 WO HCPCS: Performed by: INTERNAL MEDICINE

## 2024-12-22 PROCEDURE — 99153 MOD SED SAME PHYS/QHP EA: CPT | Performed by: INTERNAL MEDICINE

## 2024-12-22 PROCEDURE — 92978 ENDOLUMINL IVUS OCT C 1ST: CPT | Performed by: INTERNAL MEDICINE

## 2024-12-22 PROCEDURE — 93458 L HRT ARTERY/VENTRICLE ANGIO: CPT | Performed by: INTERNAL MEDICINE

## 2024-12-22 PROCEDURE — 6360000004 HC RX CONTRAST MEDICATION: Performed by: INTERNAL MEDICINE

## 2024-12-22 PROCEDURE — C1725 CATH, TRANSLUMIN NON-LASER: HCPCS | Performed by: INTERNAL MEDICINE

## 2024-12-22 PROCEDURE — 99152 MOD SED SAME PHYS/QHP 5/>YRS: CPT | Performed by: INTERNAL MEDICINE

## 2024-12-22 PROCEDURE — 6370000000 HC RX 637 (ALT 250 FOR IP): Performed by: EMERGENCY MEDICINE

## 2024-12-22 PROCEDURE — 85347 COAGULATION TIME ACTIVATED: CPT

## 2024-12-22 PROCEDURE — 92941 PRQ TRLML REVSC TOT OCCL AMI: CPT | Performed by: INTERNAL MEDICINE

## 2024-12-22 PROCEDURE — C1769 GUIDE WIRE: HCPCS | Performed by: INTERNAL MEDICINE

## 2024-12-22 PROCEDURE — C1894 INTRO/SHEATH, NON-LASER: HCPCS | Performed by: INTERNAL MEDICINE

## 2024-12-22 PROCEDURE — C1753 CATH, INTRAVAS ULTRASOUND: HCPCS | Performed by: INTERNAL MEDICINE

## 2024-12-22 PROCEDURE — 94760 N-INVAS EAR/PLS OXIMETRY 1: CPT

## 2024-12-22 PROCEDURE — 2709999900 HC NON-CHARGEABLE SUPPLY: Performed by: INTERNAL MEDICINE

## 2024-12-22 PROCEDURE — C1874 STENT, COATED/COV W/DEL SYS: HCPCS | Performed by: INTERNAL MEDICINE

## 2024-12-22 RX ORDER — FENTANYL CITRATE 50 UG/ML
INJECTION, SOLUTION INTRAMUSCULAR; INTRAVENOUS PRN
Status: DISCONTINUED | OUTPATIENT
Start: 2024-12-22 | End: 2024-12-22 | Stop reason: HOSPADM

## 2024-12-22 RX ORDER — SODIUM CHLORIDE 9 MG/ML
INJECTION, SOLUTION INTRAVENOUS CONTINUOUS PRN
Status: DISCONTINUED | OUTPATIENT
Start: 2024-12-22 | End: 2024-12-22 | Stop reason: HOSPADM

## 2024-12-22 RX ORDER — ROSUVASTATIN CALCIUM 40 MG/1
40 TABLET, COATED ORAL NIGHTLY
Status: DISCONTINUED | OUTPATIENT
Start: 2024-12-22 | End: 2024-12-22 | Stop reason: SDUPTHER

## 2024-12-22 RX ORDER — SODIUM CHLORIDE 0.9 % (FLUSH) 0.9 %
5-40 SYRINGE (ML) INJECTION PRN
Status: DISCONTINUED | OUTPATIENT
Start: 2024-12-22 | End: 2024-12-23 | Stop reason: HOSPADM

## 2024-12-22 RX ORDER — EPTIFIBATIDE 2 MG/ML
INJECTION, SOLUTION INTRAVENOUS PRN
Status: DISCONTINUED | OUTPATIENT
Start: 2024-12-22 | End: 2024-12-22 | Stop reason: HOSPADM

## 2024-12-22 RX ORDER — SODIUM CHLORIDE 9 MG/ML
INJECTION, SOLUTION INTRAVENOUS PRN
Status: DISCONTINUED | OUTPATIENT
Start: 2024-12-22 | End: 2024-12-23 | Stop reason: HOSPADM

## 2024-12-22 RX ORDER — MIDAZOLAM HYDROCHLORIDE 1 MG/ML
INJECTION, SOLUTION INTRAMUSCULAR; INTRAVENOUS PRN
Status: DISCONTINUED | OUTPATIENT
Start: 2024-12-22 | End: 2024-12-22 | Stop reason: HOSPADM

## 2024-12-22 RX ORDER — EPTIFIBATIDE 0.75 MG/ML
2 INJECTION, SOLUTION INTRAVENOUS CONTINUOUS
Status: DISPENSED | OUTPATIENT
Start: 2024-12-22 | End: 2024-12-22

## 2024-12-22 RX ORDER — IOPAMIDOL 755 MG/ML
INJECTION, SOLUTION INTRAVASCULAR PRN
Status: DISCONTINUED | OUTPATIENT
Start: 2024-12-22 | End: 2024-12-22 | Stop reason: HOSPADM

## 2024-12-22 RX ORDER — LISINOPRIL 5 MG/1
2.5 TABLET ORAL DAILY
Status: DISCONTINUED | OUTPATIENT
Start: 2024-12-22 | End: 2024-12-23 | Stop reason: HOSPADM

## 2024-12-22 RX ORDER — NITROGLYCERIN 20 MG/100ML
INJECTION INTRAVENOUS CONTINUOUS PRN
Status: COMPLETED | OUTPATIENT
Start: 2024-12-22 | End: 2024-12-22

## 2024-12-22 RX ORDER — ONDANSETRON 2 MG/ML
INJECTION INTRAMUSCULAR; INTRAVENOUS PRN
Status: DISCONTINUED | OUTPATIENT
Start: 2024-12-22 | End: 2024-12-22 | Stop reason: HOSPADM

## 2024-12-22 RX ORDER — ASPIRIN 81 MG/1
81 TABLET, CHEWABLE ORAL DAILY
Status: DISCONTINUED | OUTPATIENT
Start: 2024-12-22 | End: 2024-12-23 | Stop reason: HOSPADM

## 2024-12-22 RX ORDER — EPTIFIBATIDE 0.75 MG/ML
INJECTION, SOLUTION INTRAVENOUS CONTINUOUS PRN
Status: COMPLETED | OUTPATIENT
Start: 2024-12-22 | End: 2024-12-22

## 2024-12-22 RX ORDER — HEPARIN SODIUM 1000 [USP'U]/ML
INJECTION, SOLUTION INTRAVENOUS; SUBCUTANEOUS PRN
Status: DISCONTINUED | OUTPATIENT
Start: 2024-12-22 | End: 2024-12-22 | Stop reason: HOSPADM

## 2024-12-22 RX ORDER — ROSUVASTATIN CALCIUM 40 MG/1
40 TABLET, COATED ORAL NIGHTLY
Status: DISCONTINUED | OUTPATIENT
Start: 2024-12-22 | End: 2024-12-23 | Stop reason: HOSPADM

## 2024-12-22 RX ORDER — METOPROLOL TARTRATE 25 MG/1
25 TABLET, FILM COATED ORAL 2 TIMES DAILY
Status: DISCONTINUED | OUTPATIENT
Start: 2024-12-22 | End: 2024-12-23

## 2024-12-22 RX ORDER — SODIUM CHLORIDE 0.9 % (FLUSH) 0.9 %
5-40 SYRINGE (ML) INJECTION EVERY 12 HOURS SCHEDULED
Status: DISCONTINUED | OUTPATIENT
Start: 2024-12-22 | End: 2024-12-23 | Stop reason: HOSPADM

## 2024-12-22 RX ADMIN — TICAGRELOR 90 MG: 90 TABLET ORAL at 08:35

## 2024-12-22 RX ADMIN — METOPROLOL TARTRATE 25 MG: 25 TABLET, FILM COATED ORAL at 21:41

## 2024-12-22 RX ADMIN — EPTIFIBATIDE 2 MCG/KG/MIN: 0.75 INJECTION INTRAVENOUS at 05:04

## 2024-12-22 RX ADMIN — METOPROLOL TARTRATE 25 MG: 25 TABLET, FILM COATED ORAL at 08:35

## 2024-12-22 RX ADMIN — LISINOPRIL 2.5 MG: 5 TABLET ORAL at 08:35

## 2024-12-22 RX ADMIN — ROSUVASTATIN CALCIUM 40 MG: 40 TABLET, COATED ORAL at 21:41

## 2024-12-22 RX ADMIN — SODIUM CHLORIDE, PRESERVATIVE FREE 10 ML: 5 INJECTION INTRAVENOUS at 21:31

## 2024-12-22 RX ADMIN — ATORVASTATIN CALCIUM 80 MG: 80 TABLET, FILM COATED ORAL at 02:22

## 2024-12-22 RX ADMIN — TICAGRELOR 90 MG: 90 TABLET ORAL at 21:41

## 2024-12-22 RX ADMIN — SODIUM CHLORIDE, PRESERVATIVE FREE 10 ML: 5 INJECTION INTRAVENOUS at 08:38

## 2024-12-22 RX ADMIN — ASPIRIN 81 MG: 81 TABLET, CHEWABLE ORAL at 08:35

## 2024-12-22 RX ADMIN — METOPROLOL TARTRATE 25 MG: 25 TABLET, FILM COATED ORAL at 05:01

## 2024-12-22 ASSESSMENT — PAIN SCALES - GENERAL: PAINLEVEL_OUTOF10: 0

## 2024-12-22 NOTE — ED PROVIDER NOTES
EMERGENCY DEPARTMENT SUPERVISING PHYSICIAN NOTE    I have seen this patient & have reviewed history and findings with the PA, NP, or resident physician and provided direct supervision. I saw the patient and performed a substantive portion of the visit. I was present for key portions of any procedures performed. I've participated in medical management, monitoring, and treatment of this patient with the provider. I have reviewed currently available documentation, test results, and laboratory results in the interim. Care plan has been developed collaboratively. I take responsibility for the patient's management from when I was asked to get involved in this patient's care.    Brief HPI:  59 M reports that this evening he was at home when he developed very intense pain to the center of his chest radiating to both of his arms  Came here via EMS  Received aspirin and 2 doses of nitroglycerin while in the ambulance  Pain was alleviated entirely on arrival to the hospital  Had recurrence of excruciating substernal pain radiating to both arms with profuse sweating around 23:15 this evening    Pertinent Exam Findings:  Awake, alert, ill-appearing  Distressed with pain, profusely diaphoretic  CTAB, tachycardic, regular rhythm, 2+ radial pulses  Abdomen soft, NT  RUE - 5/5 strength, normal bulk and tone, no tremor  RLE - 5/5 strength, normal bulk and tone, no tremor  LUE - 5/5 strength, normal bulk and tone, no tremor  LLE - 5/5 strength, normal bulk and tone, no tremor  Sensation to light touch intact and equal all extremities    Plan:  Initial EKG at 21:12 does not show highly suspicious acute ischemic changes  Repeat EKG 23:16, however, is concerning for MARYA given prominent JELLY in leads V2-V5  Interventional cardiology team urgently activated  As his episode of recurrent pain was not improving readily with nitrates and opiates we rapidly performed CT imaging to assess for aortic dissection; initial review of this imaging does 
septic shock?   No   Exclusion criteria - the patient is NOT to be included for SEP-1 Core Measure due to:  Infection is not suspected    CONSULTS: (Who and What was discussed)    Discussion with Other Profesionals : Consultant my attending spoke with Dr Castro, interventional cardiologist, and started heparin. Patient taken to cath lab.    Social Determinants : Alcohol and marijuana use    Records Reviewed : Outpatient Notes cardiology office visit 9/18/2023    CC/HPI Summary, DDx, ED Course, and Reassessment: This patient presents complaining of chest pain.  He does have extensive cardiac history.  He has not had any recent cardiac testing.  There are some nonspecific findings.  He was chest pain-free upon arrival with a stable appearing EKG.  However throughout stay here, patient developed chest pain again and his EKG revealed STEMI.  Therefore, we did alert the interventional cardiologist and initiated treatment. Patient is being taken to cath lab.    Disposition Considerations (include 1 Tests not done, Shared Decision Making, Pt Expectation of Test or Tx.): admit         I am the Primary Clinician of Record.    FINAL IMPRESSION      1. ST elevation myocardial infarction (STEMI), unspecified artery (HCC)          DISPOSITION/PLAN     DISPOSITION Send To Cath Lab 12/21/2024 11:44:45 PM   DISPOSITION CONDITION Undetermined           PATIENT REFERRED TO:  No follow-up provider specified.    DISCHARGE MEDICATIONS:  New Prescriptions    No medications on file       DISCONTINUED MEDICATIONS:  Discontinued Medications    No medications on file              (Please note that portions of this note were completed with a voice recognition program.  Efforts were made to edit the dictations but occasionally words are mis-transcribed.)    Capri Tinoco PA-C (electronically signed)            Capri Tinoco PA-C  12/22/24 0002

## 2024-12-22 NOTE — H&P
[facility]  H+P  Consult  OP Visit  FU Visit   CC/HPI   CC New patient visit for cp.   HPI 59 y.o., male admitted with acute onset cp.  CP substernal, pressure, radiating back, sob, diaphoresis.  EKG in ER with STEMI.  Patient noncompliant with all cardiac meds   Cardiac Hx PCI   CARDIAC TESTING   EKG NSR, anterolateral JELLY   Troponin Lab Results   Component Value Date    TROPHS 36 (H) 12/21/2024      HISTORY/ALLERGY/ROS   MEDHx  has a past medical history of Hyperlipidemia, Hypertension, MI (myocardial infarction) (HCC), and Pyelonephritis.   SURGHx  has a past surgical history that includes Colonoscopy; Knee arthroscopy (Right); Leg Surgery (Left, 07/29/2016); and Cystoscopy (Right, 10/28/2020).   SOCHx  reports that he has never smoked. He has never used smokeless tobacco. He reports current alcohol use. He reports current drug use. Drug: Marijuana (Weed).   FAMHx family history includes High Blood Pressure in his mother; Other in his father.   ALLERG Codeine, Percocet [oxycodone-acetaminophen], and Vicodin [hydrocodone-acetaminophen]   ROS Full ROS obtained and negative except as mentioned in HPI   MEDICATIONS   Medications reviewed in Epic.   PHYSICAL EXAM   Vitals BP (!) 143/96   Pulse 80   Temp 98.5 °F (36.9 °C) (Oral)   Resp 13   Ht 1.676 m (5' 6\")   Wt 108.9 kg (240 lb)   SpO2 96%   BMI 38.74 kg/m²    Gen Alert, coop, no distress Heart  RRR, no MRG   Head NC, AT, no abnorm Abd  Soft, NT, +BS, no mass, no OM   Eyes PER, conj/corn clear Ext  Ext nl, AT, no C/C/E   Nose Nares nl, no drain, NT Pulse 2+ and symmetric   Throat Lips, mucosa, tongue nl Skin Col/text/turg nl, no vis rash/les   Neck S/S, TM, NT, no bruit/JVD Psych Nl mood and affect   Lung CTA-B, unlabored, no DTP Lymph   No cervical or axillary LA   Ch wall NT, no deform Neuro  Nl gross M/S exam      ASSESSMENT TIME   Due to the high probability of clinically significant life threating deterioration of the patient's condition

## 2024-12-22 NOTE — ED NOTES
Paged Interventional Cardiology @ 23:24 for CODE STEMI, Per Dr. Latham and Bahman SCOTT (Blaire Tinoco PA-C)

## 2024-12-23 ENCOUNTER — APPOINTMENT (OUTPATIENT)
Age: 59
DRG: 321 | End: 2024-12-23
Attending: INTERNAL MEDICINE
Payer: COMMERCIAL

## 2024-12-23 ENCOUNTER — TELEPHONE (OUTPATIENT)
Dept: CARDIOLOGY CLINIC | Age: 59
End: 2024-12-23

## 2024-12-23 VITALS
DIASTOLIC BLOOD PRESSURE: 92 MMHG | WEIGHT: 228 LBS | OXYGEN SATURATION: 98 % | BODY MASS INDEX: 36.64 KG/M2 | SYSTOLIC BLOOD PRESSURE: 112 MMHG | HEART RATE: 77 BPM | TEMPERATURE: 97.5 F | RESPIRATION RATE: 18 BRPM | HEIGHT: 66 IN

## 2024-12-23 DIAGNOSIS — I21.3 ST ELEVATION MYOCARDIAL INFARCTION (STEMI), UNSPECIFIED ARTERY (HCC): Primary | ICD-10-CM

## 2024-12-23 LAB
ANION GAP SERPL CALCULATED.3IONS-SCNC: 12 MMOL/L (ref 3–16)
BUN SERPL-MCNC: 14 MG/DL (ref 7–20)
CALCIUM SERPL-MCNC: 9 MG/DL (ref 8.3–10.6)
CHLORIDE SERPL-SCNC: 103 MMOL/L (ref 99–110)
CHOLEST SERPL-MCNC: 210 MG/DL (ref 0–199)
CO2 SERPL-SCNC: 21 MMOL/L (ref 21–32)
CREAT SERPL-MCNC: 1 MG/DL (ref 0.9–1.3)
DEPRECATED RDW RBC AUTO: 13.6 % (ref 12.4–15.4)
ECHO AO ASC DIAM: 3.1 CM
ECHO AO ASCENDING AORTA INDEX: 1.47 CM/M2
ECHO AO ROOT DIAM: 3.4 CM
ECHO AO ROOT INDEX: 1.61 CM/M2
ECHO AV AREA PEAK VELOCITY: 2.9 CM2
ECHO AV AREA VTI: 3.1 CM2
ECHO AV AREA/BSA PEAK VELOCITY: 1.4 CM2/M2
ECHO AV AREA/BSA VTI: 1.5 CM2/M2
ECHO AV MEAN GRADIENT: 2 MMHG
ECHO AV MEAN GRADIENT: 2 MMHG
ECHO AV MEAN VELOCITY: 0.7 M/S
ECHO AV MEAN VELOCITY: 0.7 M/S
ECHO AV PEAK GRADIENT: 5 MMHG
ECHO AV PEAK VELOCITY: 1.1 M/S
ECHO AV VELOCITY RATIO: 0.73
ECHO AV VTI: 21.4 CM
ECHO BSA: 2.19 M2
ECHO EST RA PRESSURE: 3 MMHG
ECHO LA AREA 2C: 18.7 CM2
ECHO LA AREA 4C: 17.5 CM2
ECHO LA MAJOR AXIS: 5.5 CM
ECHO LA MINOR AXIS: 5.3 CM
ECHO LA VOL BP: 51 ML (ref 18–58)
ECHO LA VOL MOD A2C: 56 ML (ref 18–58)
ECHO LA VOL MOD A4C: 46 ML (ref 18–58)
ECHO LA VOL/BSA BIPLANE: 24 ML/M2 (ref 16–34)
ECHO LA VOLUME INDEX MOD A2C: 27 ML/M2 (ref 16–34)
ECHO LA VOLUME INDEX MOD A4C: 22 ML/M2 (ref 16–34)
ECHO LV E' LATERAL VELOCITY: 6.09 CM/S
ECHO LV E' SEPTAL VELOCITY: 5.77 CM/S
ECHO LV EDV A2C: 150 ML
ECHO LV EDV A4C: 116 ML
ECHO LV EDV INDEX A4C: 55 ML/M2
ECHO LV EDV NDEX A2C: 71 ML/M2
ECHO LV EJECTION FRACTION A2C: 38 %
ECHO LV EJECTION FRACTION A4C: 51 %
ECHO LV EJECTION FRACTION BIPLANE: 48 % (ref 55–100)
ECHO LV ESV A2C: 93 ML
ECHO LV ESV A4C: 57 ML
ECHO LV ESV INDEX A2C: 44 ML/M2
ECHO LV ESV INDEX A4C: 27 ML/M2
ECHO LV FRACTIONAL SHORTENING: 33 % (ref 28–44)
ECHO LV INTERNAL DIMENSION DIASTOLE INDEX: 2.56 CM/M2
ECHO LV INTERNAL DIMENSION DIASTOLIC: 5.4 CM (ref 4.2–5.9)
ECHO LV INTERNAL DIMENSION SYSTOLIC INDEX: 1.71 CM/M2
ECHO LV INTERNAL DIMENSION SYSTOLIC: 3.6 CM
ECHO LV IVSD: 0.9 CM (ref 0.6–1)
ECHO LV MASS 2D: 180.1 G (ref 88–224)
ECHO LV MASS INDEX 2D: 85.4 G/M2 (ref 49–115)
ECHO LV POSTERIOR WALL DIASTOLIC: 0.9 CM (ref 0.6–1)
ECHO LV RELATIVE WALL THICKNESS RATIO: 0.33
ECHO LVOT AREA: 4.2 CM2
ECHO LVOT AV VTI INDEX: 0.76
ECHO LVOT DIAM: 2.3 CM
ECHO LVOT MEAN GRADIENT: 1 MMHG
ECHO LVOT PEAK GRADIENT: 2 MMHG
ECHO LVOT PEAK VELOCITY: 0.8 M/S
ECHO LVOT STROKE VOLUME INDEX: 31.9 ML/M2
ECHO LVOT SV: 67.3 ML
ECHO LVOT VTI: 16.2 CM
ECHO MV A VELOCITY: 0.85 M/S
ECHO MV E VELOCITY: 0.69 M/S
ECHO MV E/A RATIO: 0.81
ECHO MV E/E' LATERAL: 11.33
ECHO MV E/E' RATIO (AVERAGED): 11.64
ECHO MV E/E' SEPTAL: 11.96
ECHO PV MAX VELOCITY: 1.3 M/S
ECHO PV PEAK GRADIENT: 6 MMHG
ECHO RA AREA 4C: 13.8 CM2
ECHO RA END SYSTOLIC VOLUME APICAL 4 CHAMBER INDEX BSA: 18 ML/M2
ECHO RA VOLUME: 38 ML
ECHO RV BASAL DIMENSION: 3.9 CM
ECHO RV FREE WALL PEAK S': 14.4 CM/S
ECHO RV LONGITUDINAL DIMENSION: 7.1 CM
ECHO RV MID DIMENSION: 3 CM
ECHO RV TAPSE: 2.2 CM (ref 1.7–?)
EKG ATRIAL RATE: 71 BPM
EKG DIAGNOSIS: NORMAL
EKG P AXIS: 41 DEGREES
EKG P-R INTERVAL: 152 MS
EKG Q-T INTERVAL: 360 MS
EKG QRS DURATION: 88 MS
EKG QTC CALCULATION (BAZETT): 391 MS
EKG R AXIS: -40 DEGREES
EKG T AXIS: 94 DEGREES
EKG VENTRICULAR RATE: 71 BPM
GFR SERPLBLD CREATININE-BSD FMLA CKD-EPI: 87 ML/MIN/{1.73_M2}
GLUCOSE SERPL-MCNC: 101 MG/DL (ref 70–99)
HCT VFR BLD AUTO: 44.3 % (ref 40.5–52.5)
HDLC SERPL-MCNC: 31 MG/DL (ref 40–60)
HGB BLD-MCNC: 15.4 G/DL (ref 13.5–17.5)
LDLC SERPL CALC-MCNC: 131 MG/DL
MCH RBC QN AUTO: 31.5 PG (ref 26–34)
MCHC RBC AUTO-ENTMCNC: 34.8 G/DL (ref 31–36)
MCV RBC AUTO: 90.6 FL (ref 80–100)
PLATELET # BLD AUTO: 225 K/UL (ref 135–450)
PMV BLD AUTO: 8.6 FL (ref 5–10.5)
POTASSIUM SERPL-SCNC: 4.1 MMOL/L (ref 3.5–5.1)
RBC # BLD AUTO: 4.89 M/UL (ref 4.2–5.9)
SODIUM SERPL-SCNC: 136 MMOL/L (ref 136–145)
TRIGL SERPL-MCNC: 240 MG/DL (ref 0–150)
VLDLC SERPL CALC-MCNC: 48 MG/DL
WBC # BLD AUTO: 8.9 K/UL (ref 4–11)

## 2024-12-23 PROCEDURE — 93306 TTE W/DOPPLER COMPLETE: CPT | Performed by: INTERNAL MEDICINE

## 2024-12-23 PROCEDURE — 80048 BASIC METABOLIC PNL TOTAL CA: CPT

## 2024-12-23 PROCEDURE — C8929 TTE W OR WO FOL WCON,DOPPLER: HCPCS

## 2024-12-23 PROCEDURE — 6360000004 HC RX CONTRAST MEDICATION: Performed by: INTERNAL MEDICINE

## 2024-12-23 PROCEDURE — 6370000000 HC RX 637 (ALT 250 FOR IP): Performed by: INTERNAL MEDICINE

## 2024-12-23 PROCEDURE — 99232 SBSQ HOSP IP/OBS MODERATE 35: CPT | Performed by: INTERNAL MEDICINE

## 2024-12-23 PROCEDURE — 80061 LIPID PANEL: CPT

## 2024-12-23 PROCEDURE — 2500000003 HC RX 250 WO HCPCS: Performed by: INTERNAL MEDICINE

## 2024-12-23 PROCEDURE — 85027 COMPLETE CBC AUTOMATED: CPT

## 2024-12-23 PROCEDURE — 99239 HOSP IP/OBS DSCHRG MGMT >30: CPT | Performed by: INTERNAL MEDICINE

## 2024-12-23 PROCEDURE — 93010 ELECTROCARDIOGRAM REPORT: CPT | Performed by: INTERNAL MEDICINE

## 2024-12-23 RX ORDER — ASPIRIN 81 MG/1
81 TABLET, CHEWABLE ORAL DAILY
Qty: 30 TABLET | Refills: 3 | Status: SHIPPED | OUTPATIENT
Start: 2024-12-24

## 2024-12-23 RX ORDER — CARVEDILOL 3.12 MG/1
3.12 TABLET ORAL 2 TIMES DAILY WITH MEALS
Status: DISCONTINUED | OUTPATIENT
Start: 2024-12-23 | End: 2024-12-23 | Stop reason: HOSPADM

## 2024-12-23 RX ORDER — CARVEDILOL 3.12 MG/1
3.12 TABLET ORAL 2 TIMES DAILY WITH MEALS
Qty: 60 TABLET | Refills: 3 | Status: SHIPPED | OUTPATIENT
Start: 2024-12-23

## 2024-12-23 RX ADMIN — METOPROLOL TARTRATE 25 MG: 25 TABLET, FILM COATED ORAL at 08:42

## 2024-12-23 RX ADMIN — TICAGRELOR 90 MG: 90 TABLET ORAL at 08:42

## 2024-12-23 RX ADMIN — CARVEDILOL 3.12 MG: 3.12 TABLET, FILM COATED ORAL at 12:20

## 2024-12-23 RX ADMIN — SODIUM CHLORIDE, PRESERVATIVE FREE 10 ML: 5 INJECTION INTRAVENOUS at 08:42

## 2024-12-23 RX ADMIN — SULFUR HEXAFLUORIDE 2 ML: 60.7; .19; .19 INJECTION, POWDER, LYOPHILIZED, FOR SUSPENSION INTRAVENOUS; INTRAVESICAL at 07:45

## 2024-12-23 RX ADMIN — LISINOPRIL 2.5 MG: 5 TABLET ORAL at 08:41

## 2024-12-23 RX ADMIN — ASPIRIN 81 MG: 81 TABLET, CHEWABLE ORAL at 08:41

## 2024-12-23 ASSESSMENT — PAIN SCALES - GENERAL
PAINLEVEL_OUTOF10: 0
PAINLEVEL_OUTOF10: 0

## 2024-12-23 NOTE — DISCHARGE SUMMARY
[facility]    DISCHARGE SUMMARY      Patient ID:  Diana Pryor  [id number] 59 y.o. 1965    Admit date: 12/21/2024    Discharge date: 12/23/2024     Admitting Physician: Mateo Castro MD     Discharge Physician: Maikel Carroll MD     Admission Diagnoses: STEMI (ST elevation myocardial infarction) ([facility]) [I21.3]  ST elevation myocardial infarction (STEMI), unspecified artery (HCC) [I21.3]    Discharge Diagnoses:   Patient Active Problem List   Diagnosis    Foreign body (FB) in soft tissue    Kidney stone    Coronary artery disease due to lipid rich plaque    Primary hypertension    Mixed hyperlipidemia    Cardiac arrest    Claudication (HCC)    Marijuana abuse    Lower extremity edema    Severe obesity (BMI 35.0-39.9) with comorbidity    STEMI (ST elevation myocardial infarction) ([facility])        Discharged Condition: good    Hospital Course: Diana Pryor was admitted for anterior STEMI. Emergent LHC showed mid 99% LAD ISR. S/p PCI with DEL. Tolerated procedure well. No complications. Monitored 1 day in CVU. Echo showes, anterior and apical hypokinesis. Restarted on medications. OK to dc home post echo    Physical Examination:    BP (!) 112/92   Pulse 77   Temp 97.5 °F (36.4 °C) (Temporal)   Resp 18   Ht 1.676 m (5' 6\")   Wt 103.4 kg (228 lb)   SpO2 98%   BMI 36.80 kg/m²      General Appearance:    Alert, cooperative, no distress, appears stated age   Head:    Normocephalic, without obvious abnormality, atraumatic   Eyes:    PERRL, conjunctiva/corneas clear, EOM's intact       Ears:    Normal external ear canals, both ears   Nose:   Nares normal, septum midline, no drainage       Throat:   Lips, mucosa, and tongue normal;    Neck:   Supple, symmetrical, trachea midline, no adenopathy;        thyroid:  No enlargement/tenderness/nodules; no carotid    bruit or JVD   Back:     Symmetric, no curvature, ROM normal, no CVA tenderness   Lungs:     Clear to auscultation bilaterally, respirations

## 2024-12-23 NOTE — DISCHARGE INSTRUCTIONS
Post Angiogram/ Intervention Discharge Instructions      Do you have the help you need at home?        Activity:  You may drive in 24hrs unless instructed by your doctor   Resume normal activity in one week  Avoid lifting, pushing, or pulling more than 10 lbs with wrist for one week. (A gallon of milk is 7 lbs)  Plan rest periods during the day.  -     Avoid tension and stress.  Learn to manage your stress.  -     No typing for 3 days post-procedure      Wound Care:  You may shower, but no bathtubs, pools, or hot tubs for one week. *Do NOT submerge wrist in water  Inspect area daily.  Normal observations:  Soreness and tenderness that may last for one week, mild pink to red oozing from incision site for up to 24 hrs after discharge, bruising that could last up to two weeks.  Pay attention to arm - should be NO severe increase in pain, numbness, tingling, loss of feeling   Clean with soap and water.  NO lotion or powder.  Dry area thoroughly.  Apply Band-Aid during the day for 48 hrs (if oozing).      Nutrition:   Low fat, low salt diet (guidelines for Heart Healthy eating)  Limit caffeine to 1-2 cups per day (coffee, tea, chocolate, soda)  Limit alcohol to two servings a day ( 8 oz beer, 1 oz liquor, 4 oz wine )  Drink at least 8 to 10 glasses of decaffeinated, non-alcoholic fluid for the next 24 hours to flush the x-ray dye used for your angiogram out of your body.       What symptoms or health problems do you need to look out for after you leave the hospital? Call your physician if you have any of these symptoms.   Increased shortness of breath, weakness, or increased fatigue  A fast or a slow heartbeat  Problems or questions with your medications  Bleeding that is not stopped after holding pressure for 10 min  Feeling lightheaded or dizzy  Increased swelling or bruising of the arm/hand  Unusual pain, numbness or tingling of arm/hand  Any signs of infection ( redness, yellow drainage, swelling, pain,

## 2024-12-23 NOTE — PLAN OF CARE
Problem: Safety - Adult  Goal: Free from fall injury  Outcome: Progressing     Problem: Chronic Conditions and Co-morbidities  Goal: Patient's chronic conditions and co-morbidity symptoms are monitored and maintained or improved  Outcome: Progressing     Problem: Discharge Planning  Goal: Discharge to home or other facility with appropriate resources  Outcome: Progressing     Problem: Skin/Tissue Integrity  Goal: Absence of new skin breakdown  Description: 1.  Monitor for areas of redness and/or skin breakdown  2.  Assess vascular access sites hourly  3.  Every 4-6 hours minimum:  Change oxygen saturation probe site  4.  Every 4-6 hours:  If on nasal continuous positive airway pressure, respiratory therapy assess nares and determine need for appliance change or resting period.  Outcome: Progressing     
patient needs post-hospital services based on physician order or complex needs related to functional status, cognitive ability or social support system  Taken 12/22/2024 2000  Discharge to home or other facility with appropriate resources:   Identify barriers to discharge with patient and caregiver   Arrange for needed discharge resources and transportation as appropriate   Identify discharge learning needs (meds, wound care, etc)   Arrange for interpreters to assist at discharge as needed   Refer to discharge planning if patient needs post-hospital services based on physician order or complex needs related to functional status, cognitive ability or social support system     Problem: Skin/Tissue Integrity  Goal: Absence of new skin breakdown  Description: 1.  Monitor for areas of redness and/or skin breakdown  2.  Assess vascular access sites hourly  3.  Every 4-6 hours minimum:  Change oxygen saturation probe site  4.  Every 4-6 hours:  If on nasal continuous positive airway pressure, respiratory therapy assess nares and determine need for appliance change or resting period.  Outcome: Progressing

## 2024-12-23 NOTE — PROGRESS NOTES
[facility]  H+P  Consult  OP Visit  FU Visit   CC/INTERVAL HX   Admit 12/21/2024   CC CAD, HTN, CHOL   Subjective Doing well.  No cp, sob   Tele Reviewed available   EXAM   VS /78   Pulse 63   Temp 97.5 °F (36.4 °C) (Temporal)   Resp 13   Ht 1.676 m (5' 6\")   Wt 106.4 kg (234 lb 9.1 oz)   SpO2 99%   BMI 37.86 kg/m²    Gen Alert, coop, Ødistress Pulse 2+ and symmetric Head NC, AT, Ø abnorm   Heart RRR, no MRG Abd  Soft, NT, +BS, Ømass Eyes PER, conj/corn clr   Lung CTAB, unlab, ØDTP Nck/Thr S/s, tm, nt, Øbru/jvd, lmt Nose Nares, Ø drain, nt   Ext Ext nl, AT,  ØC/C/E Neuro Nl gross m/s exam Lymph No cerv/ax LA   Ch Wall NT, no deform Skin Col/txt/trg nl, Ørash/les Psych Nl mood/affect      MEDICATIONS   Relevant cardiac medications Reviewed in [employer])   LABS   Hgb Lab Results   Component Value Date    HGB 15.9 12/21/2024      Cr Lab Results   Component Value Date    CREATININE 1.0 12/21/2024      Trop Lab Results   Component Value Date    TROPHS 36 (H) 12/21/2024       ASSESSMENT TIME   More than 35 minutes spent reviewing patient chart (including but not limited to notes, labs, imaging and other testing), interviewing patient and/or family members, performing a physical exam, documentation of my findings above and subsequent follow-up of ordered testing.  More than 50% of that time spent face to face with patient discussing clinical condition and counseling regarding treatment plan.     ASSESSMENT AND PLAN   *STEMI  Status S/p PCI LAD  Plan Continue metoprolol, lisinopril, asa, brilinta, crestor   Echo tomorrow  *HTN  Status controlled  Plan As above  *CHOL  Status Statin restarted  Plan Continue crestor  *Compliance  Status Noncompliant with all meds  Plan Encouraged compliance  
CLINICAL PHARMACY NOTE: MEDS TO BEDS    Total # of Prescriptions Filled: 1   The following medications were delivered to the patient:  BRILINTA 90MG TABS    Additional Documentation: Konstantin BOO approved to deliver medications to patient room=signed  [facility]  
NAME:  Diana Pryor  YOB: 1965  MEDICAL RECORD NUMBER:  [id number]    Shift Summary:     Admit Weight: 108.9 kg   12/23 Weight 103.5 kg    No c/o chest pain     Uneventful     Family updated: No    Rhythm: Normal Sinus Rhythm     Most recent vitals:   Visit Vitals  BP (!) 112/92   Pulse 62   Temp 98.5 °F (36.9 °C) (Oral)   Resp 18   Ht 1.676 m (5' 6\")   Wt 103.5 kg (228 lb 2.8 oz)   SpO2 98%   BMI 36.83 kg/m²             Respiratory support needed (if any):  - RA    Admission weight Weight - Scale: 108.9 kg (240 lb) (12/21/24 2115)    Today's weight    Wt Readings from Last 1 Encounters:   12/23/24 103.5 kg (228 lb 2.8 oz)        Alvarado need assessed each shift: N/A - no alvarado present  UOP >30ml/hr: YES  Last documented BM (in last 48 hrs):  Patient Vitals for the past 48 hrs:   Last BM (including prior to admit)   12/22/24 2000 12/21/24                    Lines/Drains reviewed @ bedside.  Peripheral IV 12/21/24 Right Antecubital (Active)   Number of days: 1       Peripheral IV 12/21/24 Left Antecubital (Active)   Number of days: 0         Drip rates at handoff:    sodium chloride         Lab Data:   CBC:   Recent Labs     12/21/24 2135 12/23/24  0340   WBC 8.9 8.9   HGB 15.9 15.4   HCT 46.1 44.3   MCV 91.3 90.6    225     BMP:    Recent Labs     12/21/24 2257 12/23/24  0340    136   K 3.9 4.1   CO2 21 21   BUN 17 14   CREATININE 1.0 1.0     LIVR:   Recent Labs     12/21/24 2257   AST 20   ALT 10     PT/INR:   Recent Labs     12/21/24 2135   INR 0.90     APTT:   Recent Labs     12/21/24 2135   APTT 26.2     ABG: No results for input(s): \"PHART\", \"MVX5ZDZ\", \"PO2ART\" in the last 72 hours.    Any consults during the shift? No    Any signed and held orders to be released?  Yes          Nurse 1 eSignature: Electronically signed by Lee Ann Fierro RN on 12/23/24 at 4:20 AM EST              
Received patient from Cathlab, MIL/KI CallahanR, on RA, right radial site w/ 12ml of air in the TR band, w/ Intergralin @ 2mcg/kg/min to run until 0820H as per cathlab RN,  VSS, questions were answered, hooked to cardiac monitor, for close monitoring.    
MD, personally performed the services described in this documentation as scribed, in my presence, and it is both accurate and complete.

## 2024-12-23 NOTE — TELEPHONE ENCOUNTER
Should fill as ordered. This is a new medication. PSC wants to be sure pt tolerates dose. May change medication at 2 week f/u with NPKL. Called and updated pharmacy on plan. They v/u.

## 2024-12-23 NOTE — DISCHARGE INSTR - COC
Continuity of Care Form    Patient Name: Diana Pryor   :  1965  MRN:  [id number]    Admit date:  2024  Discharge date:  ***    Code Status Order: Full Code   Advance Directives:   Advance Care Flowsheet Documentation             Admitting Physician:  Mateo Castro MD  PCP: Neil Reynoso, APRN - CNP    Discharging Nurse: ***  Discharging Hospital Unit/Room#: CVU-2915/2915-01  Discharging Unit Phone Number: ***    Emergency Contact:   Extended Emergency Contact Information  Primary Emergency Contact: Damaris Pryor  Address: [address] [place], [place] [address] G[place]JALVA [address]  Home Phone: [phone]  Relation: Spouse    Past Surgical History:  Past Surgical History:   Procedure Laterality Date    CARDIAC PROCEDURE N/A 2024    Left heart cath / coronary angiography performed by Mateo Castro MD at [facility] CARDIAC CATH LAB    CARDIAC PROCEDURE N/A 2024    Insert stent neto coronary performed by Mateo Castro MD at [facility] CARDIAC CATH LAB    CARDIAC PROCEDURE N/A 2024    Intravascular ultrasound performed by Mateo Castro MD at [facility] CARDIAC CATH LAB    COLONOSCOPY      with polyp removal    CYSTOSCOPY Right 10/28/2020    CYSTOSCOPY RIGHT URETERAL STENT INSERTION performed by Cristofer Madsen MD at [facility] OR    KNEE ARTHROSCOPY Right     LEG SURGERY Left 2016    LEFT LOWER LEG FOREIGN BODY REMOVAL WITH FLUOROSCOPIC GUIDANCE       Immunization History:   Immunization History   Administered Date(s) Administered    TDaP, ADACEL (age 10y-64y), BOOSTRIX (age 10y+), IM, 0.5mL 2021       Active Problems:  Patient Active Problem List   Diagnosis Code    Foreign body (FB) in soft tissue M79.5    Kidney stone N20.0    Coronary artery disease due to lipid rich plaque I25.10, I25.83    Primary hypertension I10    Mixed hyperlipidemia E78.2    Cardiac arrest I46.9    Claudication (HCC) I73.9    Marijuana abuse F12.10    Lower extremity edema

## 2024-12-24 ENCOUNTER — CARE COORDINATION (OUTPATIENT)
Dept: CASE MANAGEMENT | Age: 59
End: 2024-12-24

## 2024-12-24 RX ORDER — LISINOPRIL 2.5 MG/1
TABLET ORAL
Qty: 90 TABLET | Refills: 3 | Status: SHIPPED | OUTPATIENT
Start: 2024-12-24

## 2024-12-24 RX ORDER — ATORVASTATIN CALCIUM 80 MG/1
80 TABLET, FILM COATED ORAL DAILY
Qty: 90 TABLET | Refills: 3 | Status: SHIPPED | OUTPATIENT
Start: 2024-12-24

## 2024-12-24 NOTE — CARE COORDINATION
Care Transitions Note    Initial Call - Call within 2 business days of discharge: Yes    Attempted to reach patient for transitions of care follow up. Unable to reach patient.    Outreach Attempts:   HIPAA compliant voicemail left for patient.     Patient: Diana Pryor    Patient : 1965   MRN: [id number]    Reason for Admission: STEMI, LHC with stent  Discharge Date: 24  RURS: Readmission Risk Score: 4.3    Last Discharge Facility       Date Complaint Diagnosis Description Type Department Provider    24 Chest Pain ST elevation myocardial infarction (STEMI), unspecified artery (HCC) ... ED to Hosp-Admission (Discharged) (Send to Cath) [facility] CVICU Mateo Castro MD; [place],...            Was this an external facility discharge? No    Follow Up Appointment:   Patient does not have a follow up appointment scheduled at time of call.  JERONIMO LARIOS routed a message to PCP office requesting a hospital follow up appointment.   Future Appointments         Provider Specialty Dept Phone    2025 7:40 AM Neil Reynoso, APRN - [facility] Internal Medicine [phone]    2025 1:00 PM Angelita Pimentel, APRN - CNP Cardiology [phone]    3/20/2025 10:00 AM Maikel Carroll MD Cardiology [phone]            Plan for follow-up on next business day.      JENIFER NEWELL RN

## 2024-12-24 NOTE — TELEPHONE ENCOUNTER
Patient has been discharged and needs refills sent to Pharmacy listed below    [id number]  [address], [place] [address]  P.  F. [phone]

## 2024-12-26 ENCOUNTER — CARE COORDINATION (OUTPATIENT)
Dept: CASE MANAGEMENT | Age: 59
End: 2024-12-26

## 2024-12-26 ENCOUNTER — TELEPHONE (OUTPATIENT)
Dept: INTERNAL MEDICINE CLINIC | Age: 59
End: 2024-12-26

## 2024-12-26 NOTE — CARE COORDINATION
Care Transitions Note    Initial Call - Call within 2 business days of discharge: Yes    Attempted to reach patient for transitions of care follow up. Unable to reach patient.    Outreach Attempts:   HIPAA compliant voicemail left for patient.     Patient: Diana Pryor    Patient : 1965   MRN: [id number]    Reason for Admission:   Discharge Date: 24  RURS: Readmission Risk Score: 4.3    Last Discharge Facility       Date Complaint Diagnosis Description Type Department Provider    24 Chest Pain ST elevation myocardial infarction (STEMI), unspecified artery (HCC) ... ED to Hosp-Admission (Discharged) (Send to Cath) [facility] CVICU Mateo Castro MD; [place],...            Was this an external facility discharge? No    Follow Up Appointment:   Patient has hospital follow up appointment scheduled within 14 days of discharge.    Future Appointments         Provider Specialty Dept Phone    2025 7:40 AM Neil Reynoso, APRN - [facility] Internal Medicine [phone]    2025 1:00 PM Angelita Pimentel, APRN - CNP Cardiology [phone]    3/20/2025 10:00 AM Maikel Carroll MD Cardiology [phone]            No further follow-up call indicated     Marcial David RN

## 2024-12-26 NOTE — TELEPHONE ENCOUNTER
Care Transitions Initial Follow Up Call    Outreach made within 2 business days of discharge: Yes    Patient: Diana Pryor Patient : 1965   MRN: [id number]  Reason for Admission: STEMI  Discharge Date: 24       Spoke with: Patient's Wife    Discharge department/facility: [facility] Interactive Patient Contact:  Was patient able to fill all prescriptions: Yes  Was patient instructed to bring all medications to the follow-up visit: Yes  Is patient taking all medications as directed in the discharge summary? Yes  Does patient understand their discharge instructions: Yes  Does patient have questions or concerns that need addressed prior to 7-14 day follow up office visit: yes     Additional needs identified to be addressed with provider  Patient is requesting a recommendation for a new Cardiologist. Patient does not like Dr. Carroll & is requesting who else Neli recommends.              Scheduled appointment with PCP within 7-14 days    Follow Up  Future Appointments   Date Time Provider Department Center   2025  7:40 AM Neil Reynoso, APRN - CNP [facility]   2025  1:00 PM Angelita Pimentel, APRN - CNP [facility]   3/20/2025 10:00 AM Maikel Carroll MD [facility]       Fatimah Carrasquillo MA

## 2025-01-03 NOTE — PROGRESS NOTES
instent prox, 40% mid   Cx 30% mid   RCA 40% mid, 70% mid PDA   Dominance RCA Dominant   LVEDP 10mmHg Normal 3-12mmHg   LVG EF 35% Normal >/= 55%   LVG WM Anteroapical hypokinesis   AVG Normal      INTERVENTION(S)      Antiplatelet Aspirin (ASA), Ticagrelor (Brilinta)   Anticoagulant Integrillin, heparin      Lesion 1  Guide Catheter Inserted into LMCA   Guide Wire Coronary Guidewire advanced down coronary artery without difficulty   Lesion Prox, LAD   DFR was not performed due to acute coronary syndrome (ACS)   FFR  was not performed due to acute coronary syndrome (ACS)   IVUS Was performed, distal reference 3.5, proximal reference 4.0-4.5   Pre-Dilation 3.0mm used to pre-dilate the stenosis   Stent Salvisa Synergy DEL 3.5X32   Post-Dilation 4.0mm used to post-dilate the stent  Kissing POBA with Diagonal with 3.5 and 2.5 balloons  POTS with 4.0NC   BONNIE Flow Pre 2 - Post 3   Residual None     POST CATH  RECOMMENDATIONS   Continue aggressive medical therapy and risk factor modification  DAPT indefinitely with stent thrombosis       Assessment:     Coronary artery disease   ~ STEMI/ Cardiac arrest 9/2022; s/p PCI of prox LAD   ~ STEMI; LHC 12/21/24: s/p PCI of LAD ISR. LVEF 35%.   ~ on aspirin, brilinta, coreg, statin (DAPT indefinitely with stent thrombosis)   ~ stable; denies angina     Ischemic cardiomyopathy   ~ Echo 9/2022 with EF 45-50%, echo 10/2022 EF 50-55%.  ~ Echo 12/23/24 EF 40-45%, severe apical HK (LVG during LHC with EF 35%)   ~ on coreg, lisinopril   ~ stable; appears compensated     HLD   ~  (12/2024) ; started lipitor 80. He is not interested in repatha. Prefers to take pills if he can over injections.     HTN  ~ controlled on current medications      Marijuana use   ~ recommend cessation      Patient  is stable since hospital discharge.  Plan:     Labs today to check kidneys and electrolytes d/t new isinopril.    Stop lipitor when you run out. Start Crestor 40mg nightly. Add Zetia 10mg daily.

## 2025-01-07 ENCOUNTER — OFFICE VISIT (OUTPATIENT)
Dept: INTERNAL MEDICINE CLINIC | Age: 60
End: 2025-01-07

## 2025-01-07 VITALS
OXYGEN SATURATION: 96 % | WEIGHT: 235 LBS | HEART RATE: 91 BPM | BODY MASS INDEX: 37.77 KG/M2 | HEIGHT: 66 IN | DIASTOLIC BLOOD PRESSURE: 70 MMHG | SYSTOLIC BLOOD PRESSURE: 100 MMHG

## 2025-01-07 DIAGNOSIS — Z12.5 PROSTATE CANCER SCREENING: ICD-10-CM

## 2025-01-07 DIAGNOSIS — I25.83 CORONARY ARTERY DISEASE DUE TO LIPID RICH PLAQUE: ICD-10-CM

## 2025-01-07 DIAGNOSIS — I10 PRIMARY HYPERTENSION: Primary | ICD-10-CM

## 2025-01-07 DIAGNOSIS — E78.2 MIXED HYPERLIPIDEMIA: ICD-10-CM

## 2025-01-07 DIAGNOSIS — I25.10 CORONARY ARTERY DISEASE DUE TO LIPID RICH PLAQUE: ICD-10-CM

## 2025-01-07 DIAGNOSIS — R73.01 IMPAIRED FASTING GLUCOSE: ICD-10-CM

## 2025-01-07 DIAGNOSIS — Z12.11 COLON CANCER SCREENING: ICD-10-CM

## 2025-01-07 SDOH — ECONOMIC STABILITY: INCOME INSECURITY: HOW HARD IS IT FOR YOU TO PAY FOR THE VERY BASICS LIKE FOOD, HOUSING, MEDICAL CARE, AND HEATING?: SOMEWHAT HARD

## 2025-01-07 SDOH — ECONOMIC STABILITY: FOOD INSECURITY: WITHIN THE PAST 12 MONTHS, YOU WORRIED THAT YOUR FOOD WOULD RUN OUT BEFORE YOU GOT MONEY TO BUY MORE.: NEVER TRUE

## 2025-01-07 SDOH — ECONOMIC STABILITY: FOOD INSECURITY: WITHIN THE PAST 12 MONTHS, THE FOOD YOU BOUGHT JUST DIDN'T LAST AND YOU DIDN'T HAVE MONEY TO GET MORE.: NEVER TRUE

## 2025-01-07 ASSESSMENT — PATIENT HEALTH QUESTIONNAIRE - PHQ9
SUM OF ALL RESPONSES TO PHQ QUESTIONS 1-9: 0
1. LITTLE INTEREST OR PLEASURE IN DOING THINGS: NOT AT ALL
2. FEELING DOWN, DEPRESSED OR HOPELESS: NOT AT ALL
SUM OF ALL RESPONSES TO PHQ QUESTIONS 1-9: 0
SUM OF ALL RESPONSES TO PHQ9 QUESTIONS 1 & 2: 0
SUM OF ALL RESPONSES TO PHQ QUESTIONS 1-9: 0
SUM OF ALL RESPONSES TO PHQ QUESTIONS 1-9: 0

## 2025-01-07 NOTE — PATIENT INSTRUCTIONS
[facility] Financial Resources*  (Call [employer]/211 if need more resources.)      2[employer] Watch 211   Speak to a trained professional 24/7 who can connect you to essential community services including food, clothing, transportation, housing, utilities, employment services, childcare, and baby supplies. 211 serves nationwide.   [facility] [place], [place].Urban Mapping for resources in [place], [facility], [place] and [facility] in [place]; [place], [place], [place], and [facility] in [place].   [facility] [employer].org/resources for resources in [place], [place], [place], [place], [place], [place], [place], [place], [facility] [place] [facility] according to the [facility], [place], [place], and [facility] in [place].     [employer] Financial Assistance  What they offer: Financial assistance programs that are designed to assist you in finding resources that may help pay your hospital bill. Please click on the links below to learn more about the financial assistance programs available within our regions.  Phone Number: [phone]  How to apply for the [facility] Financial Assistance Program:       Option 1: To apply for financial assistance, a patient (or their family or other provider) should fill out the Financial Assistance Application. Copies of the Financial Assistance Application and the FAP may be obtained for free by calling the [facility] Customer Service department at [phone]   Option 2: The Financial Assistance Application and policy may be obtained for free by downloading a copy from the [employer] website:  [email or web address]/patient-resources/financial-assistance  [place] Health Care Assurance Program  What they offer:  Patients who need hospital care, but are unable to pay for it, may be eligible for free or reduced fee care at [facility] through the Hospital Care Assurance Program (HCAP). Applications for HCAP are accepted by the hospital where care was received, and patients seeking HCAP assistance should contact their hospital’s billing department for

## 2025-01-08 PROBLEM — R73.01 IMPAIRED FASTING GLUCOSE: Status: ACTIVE | Noted: 2025-01-08

## 2025-01-08 PROBLEM — I46.9 CARDIAC ARREST: Status: RESOLVED | Noted: 2022-09-29 | Resolved: 2025-01-08

## 2025-01-08 ASSESSMENT — ENCOUNTER SYMPTOMS
RESPIRATORY NEGATIVE: 1
GASTROINTESTINAL NEGATIVE: 1

## 2025-01-08 NOTE — PROGRESS NOTES
SUBJECTIVE:    Patient ID: Diana Pryor is a 59 y.o. male.    CC: Hypertension, hyperlipidemia, history of myocardial infarction    HPI: Patient presents to the office today for routine follow-up of chronic medical conditions.    He has no new specific acute complaint today.    Patient was hospitalized in December for ST elevated myocardial infarction.  Patient had stopped taking his cholesterol medication and was found to have anterior STEMI with 99% LAD ISR.  He underwent PCI with DEL and tolerated the procedure well.  He was restarted on cholesterol treatment and discharged home.    Unfortunately, the patient currently does not have insurance.  He stopped his insurance due to cost.    We discussed the importance of colon cancer screening but he would like to hold off until he has insurance      Past Medical History:   Diagnosis Date    Hyperlipidemia     Hypertension     MI (myocardial infarction) (HCC)     Pyelonephritis 10/28/2020        Current Outpatient Medications   Medication Sig Dispense Refill    atorvastatin (LIPITOR) 80 MG tablet Take 1 tablet by mouth daily 90 tablet 3    lisinopril (PRINIVIL;ZESTRIL) 2.5 MG tablet TAKE 1 TABLET BY MOUTH ONCE DAILY. (hold FOR BLOOD PRESSURE less THAN 100/60) 90 tablet 3    aspirin 81 MG chewable tablet Take 1 tablet by mouth daily 30 tablet 3    carvedilol (COREG) 3.125 MG tablet Take 1 tablet by mouth 2 times daily (with meals) 60 tablet 3    ticagrelor (BRILINTA) 90 MG TABS tablet Take 1 tablet by mouth 2 times daily 60 tablet 1    Evolocumab 140 MG/ML SOAJ Inject 1 Adjustable Dose Pre-filled Pen Syringe into the skin every 14 days 6 Adjustable Dose Pre-filled Pen Syringe 3     No current facility-administered medications for this visit.           Review of Systems   Constitutional: Negative.    Respiratory: Negative.     Cardiovascular: Negative.    Gastrointestinal: Negative.    Genitourinary: Negative.    Musculoskeletal: Negative.    Neurological: Negative.

## 2025-01-09 ENCOUNTER — OFFICE VISIT (OUTPATIENT)
Dept: CARDIOLOGY CLINIC | Age: 60
End: 2025-01-09

## 2025-01-09 VITALS
HEIGHT: 66 IN | DIASTOLIC BLOOD PRESSURE: 68 MMHG | WEIGHT: 233.8 LBS | HEART RATE: 79 BPM | SYSTOLIC BLOOD PRESSURE: 112 MMHG | BODY MASS INDEX: 37.57 KG/M2 | OXYGEN SATURATION: 96 %

## 2025-01-09 DIAGNOSIS — Z79.899 HIGH RISK MEDICATION USE: ICD-10-CM

## 2025-01-09 DIAGNOSIS — E78.2 MIXED HYPERLIPIDEMIA: ICD-10-CM

## 2025-01-09 DIAGNOSIS — I25.5 ISCHEMIC CARDIOMYOPATHY: ICD-10-CM

## 2025-01-09 DIAGNOSIS — I10 HYPERTENSION, UNSPECIFIED TYPE: ICD-10-CM

## 2025-01-09 DIAGNOSIS — I25.10 CORONARY ARTERY DISEASE DUE TO LIPID RICH PLAQUE: Primary | ICD-10-CM

## 2025-01-09 DIAGNOSIS — I25.83 CORONARY ARTERY DISEASE DUE TO LIPID RICH PLAQUE: Primary | ICD-10-CM

## 2025-01-09 PROCEDURE — 99214 OFFICE O/P EST MOD 30 MIN: CPT | Performed by: NURSE PRACTITIONER

## 2025-01-09 PROCEDURE — 3074F SYST BP LT 130 MM HG: CPT | Performed by: NURSE PRACTITIONER

## 2025-01-09 PROCEDURE — 3078F DIAST BP <80 MM HG: CPT | Performed by: NURSE PRACTITIONER

## 2025-01-09 RX ORDER — ROSUVASTATIN CALCIUM 40 MG/1
40 TABLET, COATED ORAL DAILY
Qty: 90 TABLET | Refills: 1 | Status: SHIPPED | OUTPATIENT
Start: 2025-01-09

## 2025-01-09 RX ORDER — EZETIMIBE 10 MG/1
10 TABLET ORAL DAILY
Qty: 90 TABLET | Refills: 1 | Status: SHIPPED | OUTPATIENT
Start: 2025-01-09

## 2025-01-09 NOTE — PATIENT INSTRUCTIONS
Labs today to check kidneys and electrolytes after starting new lisinopril.      Stop lipitor when you run out.   Start Crestor 40mg nightly. Add Zetia 10mg daily.     Complete fasting blood work in about 3 months to reevaluate cholesterol. Fast for 8 hours prior, may have black coffee or water.     Weigh yourself daily in the Morning. Monitor for weight gain of 3lbs in a day and/or 5-7lbs in 1 week. Call if you start noticing increased shortness of breath and/or swelling. Limit salt intake to 2g (2,000mg) / day. Limit fluid intake to 64oz / day (this includes all liquids; coffee, water, soup, popsicle, ect.).        Cardiac rehab     Follow up as planned

## 2025-01-20 NOTE — TELEPHONE ENCOUNTER
Medication Refill    Medication needing refilled:  ticagrelor (BRILINTA) 90 MG TABS tablet   Dosage of the medication:   90 mg   How are you taking this medication (QD, BID, TID, QID, PRN):  Take 1 tablet by mouth 2 times daily   30 or 90 day supply called in:  30 day  When will you run out of your medication:    Which Pharmacy are we sending the medication to?:    [id number] [place] [address] - P [phone] - F [phone]

## 2025-03-11 NOTE — PROGRESS NOTES
of brilinta. Stop brilinta and start Plavix, he will call the office if symptoms do not improve.     Cardiomyopathy   Symptoms ~ none  NYHA score~ 1  Systolic    Echo ~ 12/23/2024 EF 40-45%, severe HK of apical anterior and apical lateral segments and of the apex  3/20/2025 Ef 45-50%  Ischemic  Meds ~ Lisinopril / coreg   Plan~ stable. Continue current medications    Hypertension  /76 (BP Site: Right Upper Arm, Patient Position: Sitting, BP Cuff Size: Medium Adult)   Pulse 63   Ht 1.676 m (5' 5.98\")   Wt 100.2 kg (221 lb)   SpO2 97%   BMI 35.69 kg/m²   Meds~ Lisinopril / coreg   Plan~ stable. Continue current medications    Hyperlipidemia  12/2024   HDL 31   Meds ~ rosuvastatin 40 / zetia   Plan ~ update lipids since restarting meds    Tobacco use / marijuana use   Current smoker   How much~ 1ppd  Trying to quit~ no  Counseled on importance of smoking cessation as well as options to assist with quitting.       Claudication  ABIs 12/2022 normal    Follow up 6 months    No orders of the defined types were placed in this encounter.            Maikel Carroll MD      Thank you for allowing to me to participate in the care of Diana Pryor.     Scribe's Attestation: This note was scribed in the presence of Dr. Maikel Carroll MD by Radha Rose, [id number]      I, Dr. Maikel Carroll, personally performed the services described in this documentation, as scribed by the above signed scribe in my presence. It is both accurate and complete to my knowledge. I agree with the details independently gathered by the clinical support staff, while the remaining scribed note accurately describes my personal service to the patient.       f

## 2025-03-20 ENCOUNTER — RESULTS FOLLOW-UP (OUTPATIENT)
Age: 60
End: 2025-03-20

## 2025-03-20 ENCOUNTER — OFFICE VISIT (OUTPATIENT)
Dept: CARDIOLOGY CLINIC | Age: 60
End: 2025-03-20

## 2025-03-20 ENCOUNTER — HOSPITAL ENCOUNTER (OUTPATIENT)
Age: 60
Discharge: HOME | End: 2025-03-22

## 2025-03-20 VITALS
DIASTOLIC BLOOD PRESSURE: 76 MMHG | OXYGEN SATURATION: 97 % | HEART RATE: 63 BPM | WEIGHT: 221 LBS | HEIGHT: 66 IN | SYSTOLIC BLOOD PRESSURE: 112 MMHG | BODY MASS INDEX: 35.52 KG/M2

## 2025-03-20 VITALS
BODY MASS INDEX: 37.45 KG/M2 | SYSTOLIC BLOOD PRESSURE: 117 MMHG | WEIGHT: 233 LBS | DIASTOLIC BLOOD PRESSURE: 72 MMHG | HEIGHT: 66 IN

## 2025-03-20 DIAGNOSIS — I25.2 HISTORY OF ST ELEVATION MYOCARDIAL INFARCTION (STEMI): ICD-10-CM

## 2025-03-20 DIAGNOSIS — I25.83 CORONARY ARTERY DISEASE DUE TO LIPID RICH PLAQUE: ICD-10-CM

## 2025-03-20 DIAGNOSIS — F12.10 MARIJUANA ABUSE: ICD-10-CM

## 2025-03-20 DIAGNOSIS — I10 PRIMARY HYPERTENSION: ICD-10-CM

## 2025-03-20 DIAGNOSIS — Z98.61 HISTORY OF PERCUTANEOUS CORONARY INTERVENTION: ICD-10-CM

## 2025-03-20 DIAGNOSIS — I25.10 CORONARY ARTERY DISEASE DUE TO LIPID RICH PLAQUE: ICD-10-CM

## 2025-03-20 DIAGNOSIS — I25.5 ISCHEMIC CARDIOMYOPATHY: ICD-10-CM

## 2025-03-20 DIAGNOSIS — I73.9 CLAUDICATION: ICD-10-CM

## 2025-03-20 DIAGNOSIS — I25.83 CORONARY ARTERY DISEASE DUE TO LIPID RICH PLAQUE: Primary | ICD-10-CM

## 2025-03-20 DIAGNOSIS — I25.10 CORONARY ARTERY DISEASE DUE TO LIPID RICH PLAQUE: Primary | ICD-10-CM

## 2025-03-20 DIAGNOSIS — E78.2 MIXED HYPERLIPIDEMIA: ICD-10-CM

## 2025-03-20 LAB
ECHO AO ROOT DIAM: 3.4 CM
ECHO AO ROOT INDEX: 1.6 CM/M2
ECHO AV AREA PEAK VELOCITY: 2.8 CM2
ECHO AV AREA VTI: 2.6 CM2
ECHO AV AREA/BSA PEAK VELOCITY: 1.3 CM2/M2
ECHO AV AREA/BSA VTI: 1.2 CM2/M2
ECHO AV MEAN GRADIENT: 3 MMHG
ECHO AV MEAN VELOCITY: 0.9 M/S
ECHO AV PEAK GRADIENT: 6 MMHG
ECHO AV PEAK VELOCITY: 1.2 M/S
ECHO AV VELOCITY RATIO: 0.67
ECHO AV VTI: 27 CM
ECHO BSA: 2.22 M2
ECHO IVC PROX: 1.1 CM
ECHO LA AREA 2C: 18.9 CM2
ECHO LA AREA 4C: 18.4 CM2
ECHO LA DIAMETER INDEX: 2.02 CM/M2
ECHO LA DIAMETER: 4.3 CM
ECHO LA MAJOR AXIS: 5.2 CM
ECHO LA MINOR AXIS: 5.3 CM
ECHO LA TO AORTIC ROOT RATIO: 1.26
ECHO LA VOL BP: 53 ML (ref 18–58)
ECHO LA VOL MOD A2C: 56 ML (ref 18–58)
ECHO LA VOL MOD A4C: 51 ML (ref 18–58)
ECHO LA VOL/BSA BIPLANE: 25 ML/M2 (ref 16–34)
ECHO LA VOLUME INDEX MOD A2C: 26 ML/M2 (ref 16–34)
ECHO LA VOLUME INDEX MOD A4C: 24 ML/M2 (ref 16–34)
ECHO LV E' LATERAL VELOCITY: 10.1 CM/S
ECHO LV E' SEPTAL VELOCITY: 7.4 CM/S
ECHO LV EDV A2C: 89 ML
ECHO LV EDV A4C: 116 ML
ECHO LV EDV INDEX A4C: 54 ML/M2
ECHO LV EDV NDEX A2C: 42 ML/M2
ECHO LV EF PHYSICIAN: 49 %
ECHO LV EJECTION FRACTION A2C: 45 %
ECHO LV EJECTION FRACTION A4C: 48 %
ECHO LV EJECTION FRACTION BIPLANE: 47 % (ref 55–100)
ECHO LV ESV A2C: 49 ML
ECHO LV ESV A4C: 60 ML
ECHO LV ESV INDEX A2C: 23 ML/M2
ECHO LV ESV INDEX A4C: 28 ML/M2
ECHO LV FRACTIONAL SHORTENING: 30 % (ref 28–44)
ECHO LV INTERNAL DIMENSION DIASTOLE INDEX: 2.54 CM/M2
ECHO LV INTERNAL DIMENSION DIASTOLIC: 5.4 CM (ref 4.2–5.9)
ECHO LV INTERNAL DIMENSION SYSTOLIC INDEX: 1.78 CM/M2
ECHO LV INTERNAL DIMENSION SYSTOLIC: 3.8 CM
ECHO LV IVSD: 0.9 CM (ref 0.6–1)
ECHO LV MASS 2D: 180.1 G (ref 88–224)
ECHO LV MASS INDEX 2D: 84.6 G/M2 (ref 49–115)
ECHO LV POSTERIOR WALL DIASTOLIC: 0.9 CM (ref 0.6–1)
ECHO LV RELATIVE WALL THICKNESS RATIO: 0.33
ECHO LVOT AREA: 4.2 CM2
ECHO LVOT AV VTI INDEX: 0.64
ECHO LVOT DIAM: 2.3 CM
ECHO LVOT MEAN GRADIENT: 1 MMHG
ECHO LVOT PEAK GRADIENT: 3 MMHG
ECHO LVOT PEAK VELOCITY: 0.8 M/S
ECHO LVOT STROKE VOLUME INDEX: 33.5 ML/M2
ECHO LVOT SV: 71.4 ML
ECHO LVOT VTI: 17.2 CM
ECHO MV A VELOCITY: 0.62 M/S
ECHO MV AREA VTI: 2.6 CM2
ECHO MV E DECELERATION TIME (DT): 338 MS
ECHO MV E VELOCITY: 0.8 M/S
ECHO MV E/A RATIO: 1.29
ECHO MV E/E' LATERAL: 7.92
ECHO MV E/E' RATIO (AVERAGED): 9.37
ECHO MV E/E' SEPTAL: 10.81
ECHO MV LVOT VTI INDEX: 1.63
ECHO MV MAX VELOCITY: 0.8 M/S
ECHO MV MEAN GRADIENT: 1 MMHG
ECHO MV MEAN VELOCITY: 0.5 M/S
ECHO MV PEAK GRADIENT: 3 MMHG
ECHO MV REGURGITANT PEAK GRADIENT: 3 MMHG
ECHO MV REGURGITANT PEAK VELOCITY: 0.8 M/S
ECHO MV VTI: 28 CM
ECHO RA AREA 4C: 13.8 CM2
ECHO RA END SYSTOLIC VOLUME APICAL 4 CHAMBER INDEX BSA: 15 ML/M2
ECHO RA VOLUME: 31 ML
ECHO RV BASAL DIMENSION: 3.5 CM
ECHO RV INTERNAL DIMENSION: 3.2 CM
ECHO RV LONGITUDINAL DIMENSION: 6.8 CM
ECHO RV MID DIMENSION: 2.3 CM

## 2025-03-20 PROCEDURE — 6360000004 HC RX CONTRAST MEDICATION: Performed by: NURSE PRACTITIONER

## 2025-03-20 PROCEDURE — 3074F SYST BP LT 130 MM HG: CPT | Performed by: INTERNAL MEDICINE

## 2025-03-20 PROCEDURE — G2211 COMPLEX E/M VISIT ADD ON: HCPCS | Performed by: INTERNAL MEDICINE

## 2025-03-20 PROCEDURE — C8924 2D TTE W OR W/O FOL W/CON,FU: HCPCS

## 2025-03-20 PROCEDURE — 99214 OFFICE O/P EST MOD 30 MIN: CPT | Performed by: INTERNAL MEDICINE

## 2025-03-20 PROCEDURE — 3078F DIAST BP <80 MM HG: CPT | Performed by: INTERNAL MEDICINE

## 2025-03-20 RX ORDER — CARVEDILOL 3.12 MG/1
3.12 TABLET ORAL 2 TIMES DAILY WITH MEALS
Qty: 180 TABLET | Refills: 3 | Status: SHIPPED | OUTPATIENT
Start: 2025-03-20

## 2025-03-20 RX ORDER — ASPIRIN 81 MG/1
81 TABLET, CHEWABLE ORAL DAILY
Qty: 90 TABLET | Refills: 3 | Status: SHIPPED | OUTPATIENT
Start: 2025-03-20

## 2025-03-20 RX ORDER — LISINOPRIL 2.5 MG/1
TABLET ORAL
Qty: 90 TABLET | Refills: 3 | Status: SHIPPED | OUTPATIENT
Start: 2025-03-20

## 2025-03-20 RX ORDER — ROSUVASTATIN CALCIUM 40 MG/1
40 TABLET, COATED ORAL DAILY
Qty: 90 TABLET | Refills: 3 | Status: SHIPPED | OUTPATIENT
Start: 2025-03-20

## 2025-03-20 RX ORDER — CLOPIDOGREL BISULFATE 75 MG/1
75 TABLET ORAL DAILY
Qty: 90 TABLET | Refills: 3 | Status: SHIPPED | OUTPATIENT
Start: 2025-03-20

## 2025-03-20 RX ORDER — EZETIMIBE 10 MG/1
10 TABLET ORAL DAILY
Qty: 90 TABLET | Refills: 3 | Status: SHIPPED | OUTPATIENT
Start: 2025-03-20

## 2025-03-20 RX ADMIN — SULFUR HEXAFLUORIDE 2 ML: 60.7; .19; .19 INJECTION, POWDER, LYOPHILIZED, FOR SUSPENSION INTRAVENOUS; INTRAVESICAL at 08:02

## 2025-07-07 ENCOUNTER — OFFICE VISIT (OUTPATIENT)
Dept: INTERNAL MEDICINE CLINIC | Age: 60
End: 2025-07-07

## 2025-07-07 VITALS
OXYGEN SATURATION: 97 % | WEIGHT: 215 LBS | SYSTOLIC BLOOD PRESSURE: 98 MMHG | HEART RATE: 71 BPM | DIASTOLIC BLOOD PRESSURE: 64 MMHG | HEIGHT: 65 IN | BODY MASS INDEX: 35.82 KG/M2

## 2025-07-07 DIAGNOSIS — I25.10 CORONARY ARTERY DISEASE DUE TO LIPID RICH PLAQUE: ICD-10-CM

## 2025-07-07 DIAGNOSIS — R73.01 IMPAIRED FASTING GLUCOSE: ICD-10-CM

## 2025-07-07 DIAGNOSIS — E78.2 MIXED HYPERLIPIDEMIA: ICD-10-CM

## 2025-07-07 DIAGNOSIS — Z12.11 COLON CANCER SCREENING: ICD-10-CM

## 2025-07-07 DIAGNOSIS — I25.83 CORONARY ARTERY DISEASE DUE TO LIPID RICH PLAQUE: ICD-10-CM

## 2025-07-07 DIAGNOSIS — I10 PRIMARY HYPERTENSION: Primary | ICD-10-CM

## 2025-07-07 ASSESSMENT — ENCOUNTER SYMPTOMS
RESPIRATORY NEGATIVE: 1
GASTROINTESTINAL NEGATIVE: 1

## 2025-07-07 NOTE — PROGRESS NOTES
SUBJECTIVE:    Patient ID: Diana Pryor is a 59 y.o. male.    CC: Hypertension, hyperlipidemia, coronary artery disease, prediabetes    HPI: The patient presents to the office today for routine follow-up of chronic medical conditions.    He has no new acute complaint today.    Patient has a history of hypertension, hyperlipidemia, and coronary artery disease.  He has a history of cardiac arrest and myocardial infarction.  He remains under the care of cardiology.  He has filed for disability as he reports he can no longer work as a  after his heart attacks.    He has a history of prediabetes.  His most recent A1c was stable at 6.0.  This was unchanged from previous A1c of 6.0.  He is managing this with diet and exercise.    Patient is overdue for colon cancer screening.  Cologuard has been ordered in the past but he reports he \"screwed up the test.\"  We discussed the importance of colon cancer screening and this will be reordered.    He declines vaccinations.      Past Medical History:   Diagnosis Date    Cardiac arrest (HCC) 09/29/2022    Hyperlipidemia     Hypertension     MI (myocardial infarction) ([facility])     Pyelonephritis 10/28/2020        Current Outpatient Medications   Medication Sig Dispense Refill    clopidogrel (PLAVIX) 75 MG tablet Take 1 tablet by mouth daily 90 tablet 3    aspirin 81 MG chewable tablet Take 1 tablet by mouth daily 90 tablet 3    carvedilol (COREG) 3.125 MG tablet Take 1 tablet by mouth 2 times daily (with meals) 180 tablet 3    ezetimibe (ZETIA) 10 MG tablet Take 1 tablet by mouth daily 90 tablet 3    lisinopril (PRINIVIL;ZESTRIL) 2.5 MG tablet TAKE 1 TABLET BY MOUTH ONCE DAILY. (hold FOR BLOOD PRESSURE less THAN 100/60) 90 tablet 3    rosuvastatin (CRESTOR) 40 MG tablet Take 1 tablet by mouth daily 90 tablet 3     No current facility-administered medications for this visit.           Review of Systems   Constitutional: Negative.    HENT: Negative.     Respiratory:  DISPLAY PLAN FREE TEXT

## 2025-07-11 NOTE — TELEPHONE ENCOUNTER
[facility] phone: [phone]  Fax:[phone]   Calling to see if we received paperwork for pt, advised we have and that there is a 7-10 business day window for paperwork to be signed and faxed back.
left upper arm

## (undated) DEVICE — CATH BLLN ANGIO 3.50X15MM SC EUPHORA RX

## (undated) DEVICE — CATHETER GUID 6FR L150CM RAP EXCHG L25CM TIP 5.1FR PUSHROD

## (undated) DEVICE — Z DUP USE 2522782 SOLUTION IRRIG 1000ML STRL H2O PLAS CONTAINER UROMATIC

## (undated) DEVICE — CATH LAB PACK: Brand: MEDLINE INDUSTRIES, INC.

## (undated) DEVICE — CATH BLLN ANGIO 4X6MM NC EUPHORIA RX

## (undated) DEVICE — DRAPE,ANGIO,BRACH,STERILE,38X44: Brand: MEDLINE

## (undated) DEVICE — RUNTHROUGH NS EXTRA FLOPPY PTCA GUIDEWIRE: Brand: RUNTHROUGH

## (undated) DEVICE — GLIDESHEATH SLENDER STAINLESS STEEL KIT: Brand: GLIDESHEATH SLENDER

## (undated) DEVICE — TRAY PREP DRY W/ PREM GLV 2 APPL 6 SPNG 2 UNDPD 1 OVERWRAP

## (undated) DEVICE — CYSTO/BLADDER IRRIGATION SET, REGULATING CLAMP

## (undated) DEVICE — SYRINGE MED 10ML SLIP TIP BLNT FILL AND LUERLOCK DISP

## (undated) DEVICE — GLOVE ORANGE PI 7   MSG9070

## (undated) DEVICE — BAG DRAINAGE NS

## (undated) DEVICE — CATHETER BLLN 142CM  SPRINTER LEGEND RX 2MM X 12MM GW

## (undated) DEVICE — CORONARY IMAGING CATHETER: Brand: OPTICROSS™ 6 HD

## (undated) DEVICE — CATH BLLN ANGIO 3X15MM SC EUPHORA RX

## (undated) DEVICE — Z DISCONTINUED USE 2158151 CATHETER GUID EXTRA BACKUP 3.5 0.070IN 6FR 100CM VISTA BRITE TIP

## (undated) DEVICE — CATH BLLN ANGIO 2.50X15MM SC EUPHORA RX

## (undated) DEVICE — CATH BLLN ANGIO 3.50X20MM NC EUPHORIA RX

## (undated) DEVICE — CATH BLLN ANGIO 4X15MM NC EUPHORIA RX

## (undated) DEVICE — GUIDEWIRE WITH ICE™ HYDROPHILIC COATING: Brand: CHOICE™ PT

## (undated) DEVICE — CATHETER ANGIOPLSTY BAL L12MM DIA1.5MM GWIRE 0.014IN RAP

## (undated) DEVICE — PAD, DEFIB, ADULT, RADIOTRANS, PHYSIO: Brand: MEDLINE

## (undated) DEVICE — TR BAND RADIAL ARTERY COMPRESSION DEVICE: Brand: TR BAND

## (undated) DEVICE — Device: Brand: ASAHI SION BLUE

## (undated) DEVICE — KIT AT-X65 ANGIOTOUCH HAND CONTROLLER

## (undated) DEVICE — CATHETER DIAG L100CM DIA5.2FR 0.038IN POLYUR COR JR4 STD

## (undated) DEVICE — CATH BLLN ANGIO 3X20MM NC EUPHORIA RX

## (undated) DEVICE — Device: Brand: MEDEX

## (undated) DEVICE — Device: Brand: NOMOLINE™ LH ADULT NASAL CO2 CANNULA WITH O2 4M

## (undated) DEVICE — CYSTO PACK: Brand: MEDLINE INDUSTRIES, INC.

## (undated) DEVICE — GUIDEWIRE VASC L260CM DIA0.035IN RAD 3MM J TIP L7CM PTFE

## (undated) DEVICE — SOLUTION IV IRRIG WATER 1000ML POUR BRL 2F7114